# Patient Record
Sex: FEMALE | Race: WHITE | NOT HISPANIC OR LATINO | Employment: UNEMPLOYED | URBAN - METROPOLITAN AREA
[De-identification: names, ages, dates, MRNs, and addresses within clinical notes are randomized per-mention and may not be internally consistent; named-entity substitution may affect disease eponyms.]

---

## 2017-02-23 ENCOUNTER — HOSPITAL ENCOUNTER (INPATIENT)
Facility: HOSPITAL | Age: 57
LOS: 7 days | Discharge: HOME | DRG: 392 | End: 2017-03-05
Attending: INTERNAL MEDICINE | Admitting: INTERNAL MEDICINE
Payer: COMMERCIAL

## 2017-02-23 DIAGNOSIS — R10.9 ABDOMINAL PAIN: Primary | ICD-10-CM

## 2017-02-23 DIAGNOSIS — I34.1 MITRAL VALVE PROLAPSE: ICD-10-CM

## 2017-02-23 DIAGNOSIS — K70.30 ALCOHOLIC CIRRHOSIS OF LIVER WITHOUT ASCITES: ICD-10-CM

## 2017-02-23 DIAGNOSIS — E87.1 HYPONATREMIA: ICD-10-CM

## 2017-02-23 DIAGNOSIS — R11.2 NAUSEA & VOMITING: ICD-10-CM

## 2017-02-23 DIAGNOSIS — K21.9 GASTROESOPHAGEAL REFLUX DISEASE, ESOPHAGITIS PRESENCE NOT SPECIFIED: ICD-10-CM

## 2017-02-23 DIAGNOSIS — R11.2 NAUSEA AND VOMITING, INTRACTABILITY OF VOMITING NOT SPECIFIED, UNSPECIFIED VOMITING TYPE: ICD-10-CM

## 2017-02-23 DIAGNOSIS — K52.9 GASTROENTERITIS: ICD-10-CM

## 2017-02-23 DIAGNOSIS — I10 HYPERTENSION: ICD-10-CM

## 2017-02-23 LAB
ALBUMIN SERPL BCP-MCNC: 3 G/DL
ALP SERPL-CCNC: 222 U/L
ALT SERPL W/O P-5'-P-CCNC: 18 U/L
AMYLASE SERPL-CCNC: 28 U/L
ANION GAP SERPL CALC-SCNC: 15 MMOL/L
APTT BLDCRRT: 29.8 SEC
AST SERPL-CCNC: 99 U/L
BASOPHILS # BLD AUTO: 0 K/UL
BASOPHILS NFR BLD: 0.3 %
BILIRUB SERPL-MCNC: 1.7 MG/DL
BILIRUB UR QL STRIP: NEGATIVE
BUN SERPL-MCNC: 2 MG/DL
CALCIUM SERPL-MCNC: 8.7 MG/DL
CHLORIDE SERPL-SCNC: 93 MMOL/L
CLARITY UR: CLEAR
CO2 SERPL-SCNC: 22 MMOL/L
COLOR UR: YELLOW
CREAT SERPL-MCNC: 0.7 MG/DL
DIFFERENTIAL METHOD: ABNORMAL
EOSINOPHIL # BLD AUTO: 0.1 K/UL
EOSINOPHIL NFR BLD: 0.5 %
ERYTHROCYTE [DISTWIDTH] IN BLOOD BY AUTOMATED COUNT: 14.3 %
EST. GFR  (AFRICAN AMERICAN): >60 ML/MIN/1.73 M^2
EST. GFR  (NON AFRICAN AMERICAN): >60 ML/MIN/1.73 M^2
GLUCOSE SERPL-MCNC: 112 MG/DL
GLUCOSE UR QL STRIP: NEGATIVE
HCT VFR BLD AUTO: 45.3 %
HGB BLD-MCNC: 15 G/DL
HGB UR QL STRIP: NEGATIVE
INR PPP: 1.2
KETONES UR QL STRIP: NEGATIVE
LEUKOCYTE ESTERASE UR QL STRIP: NEGATIVE
LIPASE SERPL-CCNC: 19 U/L
LYMPHOCYTES # BLD AUTO: 1.3 K/UL
LYMPHOCYTES NFR BLD: 11.5 %
MCH RBC QN AUTO: 33.3 PG
MCHC RBC AUTO-ENTMCNC: 33.1 %
MCV RBC AUTO: 101 FL
MONOCYTES # BLD AUTO: 0.9 K/UL
MONOCYTES NFR BLD: 7.3 %
NEUTROPHILS # BLD AUTO: 9.4 K/UL
NEUTROPHILS NFR BLD: 80.4 %
NITRITE UR QL STRIP: NEGATIVE
PH UR STRIP: 6 [PH] (ref 5–8)
PLATELET # BLD AUTO: 165 K/UL
PMV BLD AUTO: 9.3 FL
POTASSIUM SERPL-SCNC: 4.3 MMOL/L
PROT SERPL-MCNC: 7.1 G/DL
PROT UR QL STRIP: NEGATIVE
PROTHROMBIN TIME: 12.1 SEC
RBC # BLD AUTO: 4.51 M/UL
SODIUM SERPL-SCNC: 130 MMOL/L
SP GR UR STRIP: <=1.005 (ref 1–1.03)
TSH SERPL DL<=0.005 MIU/L-ACNC: 1.49 UIU/ML
URN SPEC COLLECT METH UR: ABNORMAL
UROBILINOGEN UR STRIP-ACNC: NEGATIVE EU/DL
WBC # BLD AUTO: 11.7 K/UL
WBC #/AREA STL HPF: NORMAL /[HPF]

## 2017-02-23 PROCEDURE — 87449 NOS EACH ORGANISM AG IA: CPT

## 2017-02-23 PROCEDURE — 83690 ASSAY OF LIPASE: CPT

## 2017-02-23 PROCEDURE — 87427 SHIGA-LIKE TOXIN AG IA: CPT | Mod: 59

## 2017-02-23 PROCEDURE — 87329 GIARDIA AG IA: CPT

## 2017-02-23 PROCEDURE — 85730 THROMBOPLASTIN TIME PARTIAL: CPT

## 2017-02-23 PROCEDURE — 82150 ASSAY OF AMYLASE: CPT

## 2017-02-23 PROCEDURE — 93005 ELECTROCARDIOGRAM TRACING: CPT

## 2017-02-23 PROCEDURE — 99219 PR INITIAL OBSERVATION CARE,LEVL II: CPT | Mod: ,,, | Performed by: INTERNAL MEDICINE

## 2017-02-23 PROCEDURE — 80053 COMPREHEN METABOLIC PANEL: CPT

## 2017-02-23 PROCEDURE — 85610 PROTHROMBIN TIME: CPT

## 2017-02-23 PROCEDURE — 85025 COMPLETE CBC W/AUTO DIFF WBC: CPT

## 2017-02-23 PROCEDURE — 25000003 PHARM REV CODE 250: Performed by: INTERNAL MEDICINE

## 2017-02-23 PROCEDURE — 36415 COLL VENOUS BLD VENIPUNCTURE: CPT

## 2017-02-23 PROCEDURE — 87046 STOOL CULTR AEROBIC BACT EA: CPT

## 2017-02-23 PROCEDURE — 84443 ASSAY THYROID STIM HORMONE: CPT

## 2017-02-23 PROCEDURE — 87209 SMEAR COMPLEX STAIN: CPT

## 2017-02-23 PROCEDURE — G0379 DIRECT REFER HOSPITAL OBSERV: HCPCS

## 2017-02-23 PROCEDURE — 63600175 PHARM REV CODE 636 W HCPCS: Performed by: INTERNAL MEDICINE

## 2017-02-23 PROCEDURE — 81003 URINALYSIS AUTO W/O SCOPE: CPT

## 2017-02-23 PROCEDURE — G0378 HOSPITAL OBSERVATION PER HR: HCPCS

## 2017-02-23 PROCEDURE — 89055 LEUKOCYTE ASSESSMENT FECAL: CPT

## 2017-02-23 PROCEDURE — 87493 C DIFF AMPLIFIED PROBE: CPT

## 2017-02-23 PROCEDURE — 87045 FECES CULTURE AEROBIC BACT: CPT

## 2017-02-23 RX ORDER — GLUCAGON 1 MG
1 KIT INJECTION
Status: DISCONTINUED | OUTPATIENT
Start: 2017-02-23 | End: 2017-03-05 | Stop reason: HOSPADM

## 2017-02-23 RX ORDER — IBUPROFEN 200 MG
1 TABLET ORAL DAILY
Status: DISCONTINUED | OUTPATIENT
Start: 2017-02-23 | End: 2017-03-05 | Stop reason: HOSPADM

## 2017-02-23 RX ORDER — HYDROCODONE BITARTRATE AND ACETAMINOPHEN 5; 325 MG/1; MG/1
1 TABLET ORAL EVERY 4 HOURS PRN
Status: DISCONTINUED | OUTPATIENT
Start: 2017-02-23 | End: 2017-03-05 | Stop reason: HOSPADM

## 2017-02-23 RX ORDER — ENOXAPARIN SODIUM 100 MG/ML
40 INJECTION SUBCUTANEOUS EVERY 24 HOURS
Status: DISCONTINUED | OUTPATIENT
Start: 2017-02-23 | End: 2017-03-05 | Stop reason: HOSPADM

## 2017-02-23 RX ORDER — IBUPROFEN 200 MG
16 TABLET ORAL
Status: DISCONTINUED | OUTPATIENT
Start: 2017-02-23 | End: 2017-03-05 | Stop reason: HOSPADM

## 2017-02-23 RX ORDER — PANTOPRAZOLE SODIUM 40 MG/1
40 TABLET, DELAYED RELEASE ORAL DAILY
Status: DISCONTINUED | OUTPATIENT
Start: 2017-02-24 | End: 2017-03-05 | Stop reason: HOSPADM

## 2017-02-23 RX ORDER — ACETAMINOPHEN 325 MG/1
650 TABLET ORAL EVERY 6 HOURS PRN
Status: DISCONTINUED | OUTPATIENT
Start: 2017-02-23 | End: 2017-03-05 | Stop reason: HOSPADM

## 2017-02-23 RX ORDER — SODIUM CHLORIDE 9 MG/ML
INJECTION, SOLUTION INTRAVENOUS CONTINUOUS
Status: DISCONTINUED | OUTPATIENT
Start: 2017-02-23 | End: 2017-03-01

## 2017-02-23 RX ORDER — IBUPROFEN 200 MG
24 TABLET ORAL
Status: DISCONTINUED | OUTPATIENT
Start: 2017-02-23 | End: 2017-03-05 | Stop reason: HOSPADM

## 2017-02-23 RX ORDER — ONDANSETRON 2 MG/ML
4 INJECTION INTRAMUSCULAR; INTRAVENOUS EVERY 6 HOURS PRN
Status: DISCONTINUED | OUTPATIENT
Start: 2017-02-23 | End: 2017-03-05 | Stop reason: HOSPADM

## 2017-02-23 RX ADMIN — NICOTINE 1 PATCH: 14 PATCH, EXTENDED RELEASE TRANSDERMAL at 06:02

## 2017-02-23 RX ADMIN — SODIUM CHLORIDE: 0.9 INJECTION, SOLUTION INTRAVENOUS at 05:02

## 2017-02-23 RX ADMIN — ENOXAPARIN SODIUM 40 MG: 100 INJECTION SUBCUTANEOUS at 05:02

## 2017-02-23 RX ADMIN — HYDROCODONE BITARTRATE AND ACETAMINOPHEN 1 TABLET: 5; 325 TABLET ORAL at 08:02

## 2017-02-23 NOTE — IP AVS SNAPSHOT
[address] [place] [address]  Phone: [phone]           Patient Discharge Instructions     Our goal is to set you up for success. This packet includes information on your condition, medications, and your home care. It will help you to care for yourself so you don't get sicker and need to go back to the hospital.     Please ask your nurse if you have any questions.        There are many details to remember when preparing to leave the hospital. Here is what you will need to do:    1. Take your medicine. If you are prescribed medications, review your Medication List in the following pages. You may have new medications to  at the pharmacy and others that you'll need to stop taking. Review the instructions for how and when to take your medications. Talk with your doctor or nurses if you are unsure of what to do.     2. Go to your follow-up appointments. Specific follow-up information is listed in the following pages. Your may be contacted by a transition nurse or clinical provider about future appointments. Be sure we have all of the phone numbers to reach you, if needed. Please contact your provider's office if you are unable to make an appointment.     3. Watch for warning signs. Your doctor or nurse will give you detailed warning signs to watch for and when to call for assistance. These instructions may also include educational information about your condition. If you experience any of warning signs to your health, call your doctor.               ** Verify the list of medication(s) below is accurate and up to date. Carry this with you in case of emergency. If your medications have changed, please notify your healthcare provider.             Medication List      START taking these medications        Additional Info                      diphenoxylate-atropine 2.5-0.025 mg 2.5-0.025 mg per tablet   Commonly known as:  LOMOTIL   Quantity:  20 tablet   Refills:  0    Dose:  1 tablet    Last time this was given:  1 tablet on 3/5/2017  9:11 AM   Instructions:  Take 1 tablet by mouth every 6 (six) hours as needed for Diarrhea.     Begin Date    AM    Noon    PM    Bedtime       lorazepam 0.5 MG tablet   Commonly known as:  ATIVAN   Quantity:  30 tablet   Refills:  0   Dose:  0.5 mg    Last time this was given:  0.5 mg on 3/4/2017  8:31 PM   Instructions:  Take 1 tablet (0.5 mg total) by mouth every evening.     Begin Date    AM    Noon    PM    Bedtime       magnesium oxide 400 mg tablet   Commonly known as:  MAG-OX   Quantity:  60 tablet   Refills:  0   Dose:  800 mg    Instructions:  Take 2 tablets (800 mg total) by mouth 3 (three) times daily with meals.     Begin Date    AM    Noon    PM    Bedtime       potassium chloride 10 MEQ Cpsr   Commonly known as:  MICRO-K   Quantity:  20 capsule   Refills:  0   Dose:  20 mEq    Instructions:  Take 2 capsules (20 mEq total) by mouth once daily.     Begin Date    AM    Noon    PM    Bedtime       spironolactone 25 MG tablet   Commonly known as:  ALDACTONE   Quantity:  30 tablet   Refills:  0   Dose:  25 mg    Last time this was given:  25 mg on 3/5/2017  8:27 AM   Instructions:  Take 1 tablet (25 mg total) by mouth once daily.     Begin Date    AM    Noon    PM    Bedtime         CHANGE how you take these medications        Additional Info                      trazodone 100 MG tablet   Commonly known as:  DESYREL   Quantity:  30 tablet   Refills:  0   Dose:  100 mg   What changed:  how much to take    Last time this was given:  50 mg on 3/4/2017  8:31 PM   Instructions:  Take 1 tablet (100 mg total) by mouth every evening.     Begin Date    AM    Noon    PM    Bedtime         CONTINUE taking these medications        Additional Info                      atenolol 100 MG tablet   Commonly known as:  TENORMIN   Refills:  0   Dose:  100 mg    Last time this was given:  100 mg on 3/4/2017  8:31 PM   Instructions:  Take 100 mg by mouth every  evening.     Begin Date    AM    Noon    PM    Bedtime       irbesartan 150 MG tablet   Commonly known as:  AVAPRO   Refills:  0   Dose:  150 mg    Last time this was given:  150 mg on 3/5/2017  8:27 AM   Instructions:  Take 150 mg by mouth once daily.     Begin Date    AM    Noon    PM    Bedtime       omeprazole 20 MG capsule   Commonly known as:  PRILOSEC   Refills:  0   Dose:  20 mg    Instructions:  Take 20 mg by mouth once daily.     Begin Date    AM    Noon    PM    Bedtime         STOP taking these medications     sodium chloride 1 gram tablet            Where to Get Your Medications      These medications were sent to [id number] - [place] - [address] AT [facility] [address]  [address] [address]     Phone:  [phone]     diphenoxylate-atropine 2.5-0.025 mg 2.5-0.025 mg per tablet    lorazepam 0.5 MG tablet    potassium chloride 10 MEQ Cpsr    spironolactone 25 MG tablet    trazodone 100 MG tablet         You can get these medications from any pharmacy     You don't need a prescription for these medications     magnesium oxide 400 mg tablet                  Please bring to all follow up appointments:    1. A copy of your discharge instructions.  2. All medicines you are currently taking in their original bottles.  3. Identification and insurance card.    Please arrive 15 minutes ahead of scheduled appointment time.    Please call 24 hours in advance if you must reschedule your appointment and/or time.        Follow-up Information     Follow up with Ammon Santos MD In 2 weeks.    Specialty:  Family Medicine    Contact information:    [address] [place] [address] [phone]          Follow up with Sayra Monsno MD In 2 weeks.    Specialties:  Gastroenterology, Transplant, Hepatology    Contact information:    [address]  [facility] [id number] [phone]          Discharge Instructions     Future Orders    CT Abdomen  Pelvis W Wo Contrast     Process Instructions:    No food or drink for 4 hours prior to the exam.  The patient must arrive in radiology 2 hours prior to exam for prep (drinking barium).    Scheduling Instructions:    Please add liver spleen volume, triple phase   Pt will be in Room 320    Questions:    Oral/Rectal Contrast instructions:  Routine Oral Contrast    Special CT ABD Protocol Request?:  Routine    Reason for Exam:  abd pain    Is the patient pregnant?:  No    Is the patient allergic to iodine or contrast? Has a steroid / antihistamine prep been administered?:  No    Is the patient on ANY Metformin drug such as Glugophage/Glucovance?           Should be off drug 48 hours after contrast. Check renal function before restart.:  No    Age > 60 years?:  Yes    History of Kidney Disease - including: decreased kidney function, dialysis, kidney transplay, single kidney, kidney cancer, kidney surgery?:  None    Does the patient have high blood preasure requiring medical treatment?:  Yes    Diabetes?:  No    May the Radiologist modify the order per protocol to meet the clinical needs of the patient?:  Yes    Recist criteria?:  No    Will this service be billed to a Worker's Comp policy?:  No    CT Chest Without Contrast     Process Instructions:    No food or drink 4 hours prior to exam.    Questions:    Reason for Exam:      Is the patient pregnant?:  No    May the Radiologist modify the order per protocol to meet the clinical needs of the patient?:  Yes    Activity as tolerated     Call MD for:  difficulty breathing or increased cough     Call MD for:  persistent nausea and vomiting or diarrhea     Call MD for:  temperature >100.4     Diet general     Questions:    Total calories:      Fat restriction, if any:      Protein restriction, if any:      Na restriction, if any:      Fluid restriction:      Additional restrictions:          Discharge Instructions       Thank you for choosing [facility] for your  "medical care. The primary doctor who is taking care of you at the time of your discharge is Susy Johnson MD.     You were admitted to the hospital with Hypomagnesemia.     Please note your discharge instructions, including diet/activity restrictions, follow-up appointments, and medication changes.  If you have any questions about your medical issues, prescriptions, or any other questions, please feel free to contact the [facility] Medicine Dept at [phone] and we will help.    If you are previously with Home health, outpatient PT/OT or under a therapy program, you are cleared to return to those programs.    Please direct all long term medication refills and follow up to your primary care provider, Ammon Santos MD. Thank you again for letting us take care of your health care needs.    CALL OFFICE TO SCHEDULE OUTPATIENT LABS.      Discharge References/Attachments     CIRRHOSIS OF THE LIVER, DISCHARGE INSTRUCTIONS FOR (ENGLISH)    ALCOHOL ADDICTION (ALCOHOLISM), SIGNS OF (ENGLISH)    ALCOHOLISM: GETTING HELP (ENGLISH)    ALCOHOLISM: RESOURCES FOR FAMILY AND FRIENDS (ENGLISH)        Primary Diagnosis     Your primary diagnosis was:  Low Blood Magnesium Level      Admission Information     Date & Time Provider Department CSN    2/23/2017  3:10 PM Susy Johnson MD [facility] [id number]      Care Providers     Provider Role Specialty Primary office phone    Susy Johnson MD Attending Provider Internal Medicine [phone]    Shanae Gonzalez MD Consulting Physician  Gastroenterology [phone]    Shanae Gonzalez MD Surgeon  Gastroenterology [phone]      Your Vitals Were     BP Pulse Temp Resp Height Weight    148/72 80 98.4 °F (36.9 °C) (Oral) 16 5' 1" (1.549 m) 79.8 kg (176 lb)    SpO2 BMI             92% 33.25 kg/m2         Recent Lab Values     No lab values to display.      Pending Labs     Order Current Status    Chromogranin A In process    Pancreastatin In " process    Specimen to Pathology - Surgery In process    Stool Exam-Ova,Cysts,Parasites In process      Allergies as of 3/5/2017     No Known Allergies      [facility]     [facility] Nurse Care Line - 24/7 Assistance  Unless otherwise directed by your provider, please contact [facility], our nurse care line that is available for 24/7 assistance.     Registered nurses in the [facility] provide clinical advisement, health education, appointment booking, and other advisory services.  Call for this free service at [phone].        Advance Directives     An advance directive is a document which, in the event you are no longer able to make decisions for yourself, tells your healthcare team what kind of treatment you do or do not want to receive, or who you would like to make those decisions for you.  If you do not currently have an advance directive, [facility] encourages you to create one.  For more information call:  [phone]), [phone] ([phone]),  or log on to [email or web address].        Language Assistance Services     ATTENTION: Language assistance services are available, free of charge. Please call [phone].      ATENCIÓN: Si habla español, tiene a ricketts disposición servicios gratuitos de asistencia lingüística. Llame al [phone].     CHÚ Ý: N?u b?n nói Ti?ng Vi?t, có các d?ch v? h? tr? ngôn ng? mi?n phí dành cho b?n. G?i s? [phone].         [facility] complies with applicable Federal civil rights laws and does not discriminate on the basis of race, color, national origin, age, disability, or sex.

## 2017-02-23 NOTE — PLAN OF CARE
Problem: Patient Care Overview  Goal: Plan of Care Review  Outcome: Ongoing (interventions implemented as appropriate)  Pt C/O minimal pain to bilat lower abd and right medial abd. C/O intermittent nausea and diarrhea. Hat in toilet to collect stool sample. Pt educated on fall risk.

## 2017-02-23 NOTE — H&P
PCP: Ammon Santos MD    History & Physical    Chief Complaint: Nausea, vomiting diarrhea and dehydration for 6 days.    History of Present Illness:  Patient is a 57 y.o. female admitted to Hospitalist Service from Dr. Santos's office with complaint of nausea, vomiting and diarrhea for 6 days. Patient reportedly has past medical history significant for GERD, hypertension, hyponatremia (chronic) and MVP. Patient reported 10-20 loose watery bowel movements. Patient stated, her abdomen states distended. No melena, bleeding per rectum, use of NSAIDs or antibiotics reported recently. No sick contact or recent travel history. Patient reports frequent nausea and episode of emesis, on an average twice daily for the past week. No fever or chills. Patient denied chest pain, shortness of breath, abdominal pain, headache, vision changes, focal neuro-deficits, cough or fever.    Past Medical History   Diagnosis Date    GERD (gastroesophageal reflux disease)     Hypertension     Hyponatremia     Mitral valve prolapse      Past Surgical History   Procedure Laterality Date    Hysterectomy      Tonsillectomy       Family History   Problem Relation Age of Onset    Pancreatic cancer Mother      Social History   Substance Use Topics    Smoking status: Current Every Day Smoker     Packs/day: 1.00     Years: 30.00    Smokeless tobacco: None    Alcohol use 9.6 oz/week     2 Standard drinks or equivalent, 14 Glasses of wine per week      Review of patient's allergies indicates:  No Known Allergies  Facility-Administered Medications Prior to Admission   Medication    midazolam (PF) 5 mg/mL injection 5 mg     PTA Medications   Medication Sig    atenolol (TENORMIN) 100 MG tablet Take 100 mg by mouth every evening.     irbesartan (AVAPRO) 150 MG tablet Take 150 mg by mouth once daily.    sodium chloride 1 gram tablet Take 1 g by mouth once daily.    trazodone (DESYREL) 100 MG tablet Take 50 mg by mouth every evening.     omeprazole (PRILOSEC) 20 MG capsule Take 20 mg by mouth once daily.     Review of Systems:  Constitutional: no fever or chills  Eyes: no visual changes  Ears, nose, mouth, throat, and face: no nasal congestion or sore throat  Respiratory: no cough or shorness of breath  Cardiovascular: no chest pain or palpitations  Gastrointestinal: see HPI  Genitourinary: no hematuria or dysuria  Integument/breast: no rash or pruritis  Hematologic/lymphatic: no easy bruising or lymphadenopathy  Musculoskeletal: no arthralgias or myalgias  Neurological: no seizures or tremors.  Behavioral/Psych: no auditory or visual hallucinations  Endocrine: no heat or cold intolerance     OBJECTIVE:     Vital Signs (Most Recent)  Temp: 97.8 °F (36.6 °C) (02/23/17 1551)  Pulse: 79 (02/23/17 1551)  Resp: 18 (02/23/17 1551)  BP: 115/73 (02/23/17 1551)  SpO2: 96 % (02/23/17 1551)    Physical Exam:  General appearance: well developed, appears stated age  Head: normocephalic, atraumatic  Eyes:  conjunctivae/corneas clear. PERRL.  Nose: Nares normal. Septum midline.  Throat: lips, mucosa, and tongue normal; teeth and gums normal, no throat erythema.  Neck: supple, symmetrical, trachea midline, no JVD and thyroid not enlarged, symmetric, no tenderness/mass/nodules  Lungs:  clear to auscultation bilaterally and normal respiratory effort  Chest wall: no tenderness  Heart: regular rate and rhythm, S1, S2 normal, no murmur, click, rub or gallop  Abdomen: soft, non-tender non-distented; bowel sounds normal; Enlarged liver and spleen noted - non-tender.  Extremities: no cyanosis, clubbing or edema.   Pulses: 2+ and symmetric  Skin: Skin color, texture, turgor normal. No rashes or lesions.  Lymph nodes: Cervical, supraclavicular, and axillary nodes normal.  Neurologic: Normal strength and tone. No focal numbness or weakness. CNII-XII intact.      Laboratory:   CBC: No results for input(s): WBC, RBC, HGB, HCT, PLT, MCV, MCH, MCHC in the last 168 hours.  CMP: No  results for input(s): GLU, CALCIUM, ALBUMIN, PROT, NA, K, CO2, CL, BUN, CREATININE, ALKPHOS, ALT, AST, BILITOT in the last 168 hours.    No results found for: HGBA1C  Microbiology Results (last 7 days)     ** No results found for the last 168 hours. **        Diagnostic Results:  Chest X-Ray: Pending    Assessment/Plan:     * Acute Gastroenteritis  Case discussed with Dr. Santos.   Consult Gastronetrologist.   Obtain KUB.  Obtain abdominal US to evaluate hepato-splenomegaly.  Check CBC with differential.  Check CBC, CMP, lipase, amylase and UA.  Check stool for C. Diff, O+P, Cx, WBC and Giardia serology.  Continue IVF hydration.   Use IV anti-emetics as needed.     Nausea & vomiting  Supportive care.  Use anti-emetics as needed.  Continue IVF hydration.    Hypertension  Chronic problem. Will continue chronic medications and monitor for any changes, adjusting as needed.    GERD (gastroesophageal reflux disease)  Continue PPI.    Mitral valve prolapse  Tele-monitoring.    Hyponatremia - Chronic  Check TSH.  Follow serum lytes.    VTE Risk Mitigation         Ordered     enoxaparin injection 40 mg  Daily     Route:  Subcutaneous        02/23/17 1637     Medium Risk of VTE  Once      02/23/17 1637        Susy Johnson MD  Department of Hospital Medicine   [facility]

## 2017-02-23 NOTE — ASSESSMENT & PLAN NOTE
Case discussed with Dr. Santos.   Consult Gastronetrologist.   Obtain KUB.  Obtain abdominal US to evaluate hepato-splenomegaly.  Check CBC with differential.  Check CBC, CMP, lipase, amylase and UA.  Check stool for C. Diff, O+P, Cx, WBC and Giardia serology.  Continue IVF hydration.   Use IV anti-emetics as needed.

## 2017-02-24 LAB
ALBUMIN SERPL BCP-MCNC: 2.6 G/DL
ALP SERPL-CCNC: 185 U/L
ALT SERPL W/O P-5'-P-CCNC: 14 U/L
ANION GAP SERPL CALC-SCNC: 13 MMOL/L
AST SERPL-CCNC: 68 U/L
BASOPHILS # BLD AUTO: 0.1 K/UL
BASOPHILS NFR BLD: 0.8 %
BILIRUB SERPL-MCNC: 1.7 MG/DL
BUN SERPL-MCNC: 2 MG/DL
C DIFF GDH STL QL: POSITIVE
C DIFF TOX A+B STL QL IA: NEGATIVE
C DIFF TOX GENS STL QL NAA+PROBE: NEGATIVE
CALCIUM SERPL-MCNC: 7.9 MG/DL
CHLORIDE SERPL-SCNC: 95 MMOL/L
CO2 SERPL-SCNC: 24 MMOL/L
CREAT SERPL-MCNC: 0.6 MG/DL
DIFFERENTIAL METHOD: ABNORMAL
E COLI SXT1 STL QL IA: NEGATIVE
E COLI SXT2 STL QL IA: NEGATIVE
EOSINOPHIL # BLD AUTO: 0.1 K/UL
EOSINOPHIL NFR BLD: 1.1 %
ERYTHROCYTE [DISTWIDTH] IN BLOOD BY AUTOMATED COUNT: 14.7 %
EST. GFR  (AFRICAN AMERICAN): >60 ML/MIN/1.73 M^2
EST. GFR  (NON AFRICAN AMERICAN): >60 ML/MIN/1.73 M^2
GLUCOSE SERPL-MCNC: 86 MG/DL
HCT VFR BLD AUTO: 39.2 %
HGB BLD-MCNC: 13.3 G/DL
LYMPHOCYTES # BLD AUTO: 1.9 K/UL
LYMPHOCYTES NFR BLD: 23.8 %
MAGNESIUM SERPL-MCNC: 0.7 MG/DL
MCH RBC QN AUTO: 33.9 PG
MCHC RBC AUTO-ENTMCNC: 33.8 %
MCV RBC AUTO: 100 FL
MONOCYTES # BLD AUTO: 0.7 K/UL
MONOCYTES NFR BLD: 8.9 %
NEUTROPHILS # BLD AUTO: 5.2 K/UL
NEUTROPHILS NFR BLD: 65.4 %
PLATELET # BLD AUTO: 154 K/UL
PMV BLD AUTO: 8.9 FL
POTASSIUM SERPL-SCNC: 3.8 MMOL/L
PROT SERPL-MCNC: 5.9 G/DL
RBC # BLD AUTO: 3.91 M/UL
SODIUM SERPL-SCNC: 132 MMOL/L
WBC # BLD AUTO: 7.9 K/UL

## 2017-02-24 PROCEDURE — 99233 SBSQ HOSP IP/OBS HIGH 50: CPT | Mod: ,,, | Performed by: INTERNAL MEDICINE

## 2017-02-24 PROCEDURE — 80053 COMPREHEN METABOLIC PANEL: CPT

## 2017-02-24 PROCEDURE — 85025 COMPLETE CBC W/AUTO DIFF WBC: CPT

## 2017-02-24 PROCEDURE — 36415 COLL VENOUS BLD VENIPUNCTURE: CPT

## 2017-02-24 PROCEDURE — 25000003 PHARM REV CODE 250: Performed by: NURSE PRACTITIONER

## 2017-02-24 PROCEDURE — 63600175 PHARM REV CODE 636 W HCPCS: Performed by: INTERNAL MEDICINE

## 2017-02-24 PROCEDURE — 93005 ELECTROCARDIOGRAM TRACING: CPT

## 2017-02-24 PROCEDURE — G0378 HOSPITAL OBSERVATION PER HR: HCPCS

## 2017-02-24 PROCEDURE — 25000003 PHARM REV CODE 250: Performed by: INTERNAL MEDICINE

## 2017-02-24 PROCEDURE — 89055 LEUKOCYTE ASSESSMENT FECAL: CPT

## 2017-02-24 PROCEDURE — 83735 ASSAY OF MAGNESIUM: CPT

## 2017-02-24 RX ORDER — CHOLESTYRAMINE 4 G/4.8G
1 POWDER, FOR SUSPENSION ORAL EVERY 12 HOURS PRN
Status: DISCONTINUED | OUTPATIENT
Start: 2017-02-24 | End: 2017-02-28

## 2017-02-24 RX ORDER — PANTOPRAZOLE SODIUM 40 MG/1
40 TABLET, DELAYED RELEASE ORAL DAILY
Status: DISCONTINUED | OUTPATIENT
Start: 2017-02-24 | End: 2017-02-24

## 2017-02-24 RX ORDER — DIAZEPAM 10 MG/2ML
5 INJECTION INTRAMUSCULAR ONCE
Status: DISCONTINUED | OUTPATIENT
Start: 2017-02-24 | End: 2017-03-05 | Stop reason: HOSPADM

## 2017-02-24 RX ORDER — TRAZODONE HYDROCHLORIDE 50 MG/1
50 TABLET ORAL NIGHTLY
Status: DISCONTINUED | OUTPATIENT
Start: 2017-02-24 | End: 2017-03-05

## 2017-02-24 RX ORDER — FOLIC ACID 1 MG/1
1 TABLET ORAL 2 TIMES DAILY
Status: DISCONTINUED | OUTPATIENT
Start: 2017-02-24 | End: 2017-03-05 | Stop reason: HOSPADM

## 2017-02-24 RX ORDER — CIPROFLOXACIN 500 MG/1
500 TABLET ORAL EVERY 12 HOURS
Status: DISCONTINUED | OUTPATIENT
Start: 2017-02-24 | End: 2017-02-27

## 2017-02-24 RX ORDER — ATENOLOL 50 MG/1
100 TABLET ORAL NIGHTLY
Status: DISCONTINUED | OUTPATIENT
Start: 2017-02-24 | End: 2017-03-05 | Stop reason: HOSPADM

## 2017-02-24 RX ORDER — THIAMINE HCL 100 MG
100 TABLET ORAL 2 TIMES DAILY
Status: DISCONTINUED | OUTPATIENT
Start: 2017-02-24 | End: 2017-03-05 | Stop reason: HOSPADM

## 2017-02-24 RX ORDER — METRONIDAZOLE 500 MG/1
500 TABLET ORAL EVERY 8 HOURS
Status: DISCONTINUED | OUTPATIENT
Start: 2017-02-24 | End: 2017-02-27

## 2017-02-24 RX ORDER — IRBESARTAN 150 MG/1
150 TABLET ORAL DAILY
Status: DISCONTINUED | OUTPATIENT
Start: 2017-02-24 | End: 2017-03-05 | Stop reason: HOSPADM

## 2017-02-24 RX ORDER — L. ACIDOPHILUS/L.BULGARICUS 100MM CELL
1 GRANULES IN PACKET (EA) ORAL 2 TIMES DAILY
Status: DISCONTINUED | OUTPATIENT
Start: 2017-02-24 | End: 2017-03-05 | Stop reason: HOSPADM

## 2017-02-24 RX ADMIN — METRONIDAZOLE 500 MG: 500 TABLET ORAL at 09:02

## 2017-02-24 RX ADMIN — METRONIDAZOLE 500 MG: 500 TABLET ORAL at 01:02

## 2017-02-24 RX ADMIN — LACTOBACILLUS ACIDOPHILUS / LACTOBACILLUS BULGARICUS 1 EACH: 100 MILLION CFU STRENGTH GRANULES at 01:02

## 2017-02-24 RX ADMIN — HYDROCODONE BITARTRATE AND ACETAMINOPHEN 1 TABLET: 5; 325 TABLET ORAL at 04:02

## 2017-02-24 RX ADMIN — HYDROCODONE BITARTRATE AND ACETAMINOPHEN 1 TABLET: 5; 325 TABLET ORAL at 07:02

## 2017-02-24 RX ADMIN — FOLIC ACID 1 MG: 1 TABLET ORAL at 09:02

## 2017-02-24 RX ADMIN — SODIUM CHLORIDE: 0.9 INJECTION, SOLUTION INTRAVENOUS at 02:02

## 2017-02-24 RX ADMIN — THIAMINE HCL TAB 100 MG 100 MG: 100 TAB at 09:02

## 2017-02-24 RX ADMIN — PANTOPRAZOLE SODIUM 40 MG: 40 TABLET, DELAYED RELEASE ORAL at 08:02

## 2017-02-24 RX ADMIN — ATENOLOL 100 MG: 50 TABLET ORAL at 09:02

## 2017-02-24 RX ADMIN — NICOTINE 1 PATCH: 14 PATCH, EXTENDED RELEASE TRANSDERMAL at 08:02

## 2017-02-24 RX ADMIN — IRBESARTAN 150 MG: 150 TABLET ORAL at 08:02

## 2017-02-24 RX ADMIN — ENOXAPARIN SODIUM 40 MG: 100 INJECTION SUBCUTANEOUS at 01:02

## 2017-02-24 RX ADMIN — THIAMINE HCL TAB 100 MG 100 MG: 100 TAB at 01:02

## 2017-02-24 RX ADMIN — ATENOLOL 100 MG: 50 TABLET ORAL at 02:02

## 2017-02-24 RX ADMIN — LACTOBACILLUS ACIDOPHILUS / LACTOBACILLUS BULGARICUS 1 EACH: 100 MILLION CFU STRENGTH GRANULES at 09:02

## 2017-02-24 RX ADMIN — MAGNESIUM SULFATE HEPTAHYDRATE 3 G: 500 INJECTION, SOLUTION INTRAMUSCULAR; INTRAVENOUS at 03:02

## 2017-02-24 RX ADMIN — FOLIC ACID 1 MG: 1 TABLET ORAL at 01:02

## 2017-02-24 RX ADMIN — CIPROFLOXACIN HYDROCHLORIDE 500 MG: 500 TABLET, FILM COATED ORAL at 11:02

## 2017-02-24 RX ADMIN — TRAZODONE HYDROCHLORIDE 50 MG: 50 TABLET ORAL at 09:02

## 2017-02-24 NOTE — PROGRESS NOTES
Attempted to complete DC assessment however pt is having a procedure done at bedside. I will return to follow up. Arabella Gonzalez LMSW

## 2017-02-24 NOTE — PLAN OF CARE
Problem: Patient Care Overview  Goal: Plan of Care Review  Outcome: Ongoing (interventions implemented as appropriate)  Today my patient refused HIDA scan due to claustrophobia. Magnesium level was 0.7, initiated 3 grams of Magnesium Sulfate at 1525 at 62.5 mLs/hour. Patient reports less pain today gradually throughout the day, and only had one pain medication during my shift.

## 2017-02-24 NOTE — PLAN OF CARE
Problem: Patient Care Overview  Goal: Plan of Care Review  Outcome: Ongoing (interventions implemented as appropriate)  POC discussed with pt, she verbalized understanding to same. Q2hour rounding/safety checks utilized. Pt. Able to verbalize her needs. Denies N/V. Pt. Had 2 small stools overnight. José Antonio colored, frothy. Specimen sent to lab. VSS. Minimal complaints of pain, well controlled with po pain meds. Remains free from injury. Call light in reach. Bed low and locked. Nonskid socks when oob.

## 2017-02-24 NOTE — PROGRESS NOTES
Dr. Johnson notified of critical lab value of magnesium 0.7mg/dL.  Orders placed for daily magnesium and phosphorus level and 3 grams Magnesium IVPB.  Will continue to monitor.

## 2017-02-24 NOTE — PROGRESS NOTES
Monitor room notified this nurse of 9 beat run of V-tach.  Patient reports being asymptomatic at this time and VSS.  Dr. Johnson notified of cardiac event.  Orders placed for magnesium level and EKG.     EKG performed.  Will continue to monitor patient.

## 2017-02-24 NOTE — PROGRESS NOTES
Progress Note  Hospital Medicine  Patient Name:Sophia Edward  MRN:  [id number]  Patient Class: OP- Observation  Admit Date: 2/23/2017  Length of Stay: 0 days  Expected Discharge Date:   Attending Physician: Susy Johnson MD  Primary Care Provider:  Ammon Santos MD    SUBJECTIVE:     Principal Problem: Gastroenteritis  Initial history of present illness:Patient is a 57 y.o. female admitted to Hospitalist Service from Dr. Santos's office with complaint of nausea, vomiting and diarrhea for 6 days. Patient reportedly has past medical history significant for GERD, hypertension, hyponatremia (chronic) and MVP. Patient reported 10-20 loose watery bowel movements. Patient stated, her abdomen states distended. No melena, bleeding per rectum, use of NSAIDs or antibiotics reported recently. No sick contact or recent travel history. Patient reports frequent nausea and episode of emesis, on an average twice daily for the past week. No fever or chills. Patient denied chest pain, shortness of breath, abdominal pain, headache, vision changes, focal neuro-deficits, cough or fever.    PMH/PSH/SH/FH/Meds: reviewed.    Symptoms/Review of Systems: Continued diarrhea. Reports drinking 3 drinks a night. No shortness of breath, cough, chest pain or headache, fever or abdominal pain.     Diet:  Adequate intake.    Activity level: Normal.    Pain:  Patient reports no pain.       OBJECTIVE:   Vital Signs (Most Recent):      Temp: 98.1 °F (36.7 °C) (02/24/17 0722)  Pulse: 67 (02/24/17 0722)  Resp: 17 (02/24/17 0722)  BP: 135/73 (02/24/17 0722)  SpO2: 96 % (02/24/17 0722)       Vital Signs Range (Last 24H):  Temp:  [97.8 °F (36.6 °C)-98.2 °F (36.8 °C)]   Pulse:  [63-79]   Resp:  [17-18]   BP: (115-135)/(67-78)   SpO2:  [96 %-98 %]     Weight: 67.6 kg (149 lb)  Body mass index is 28.15 kg/(m^2).  No intake or output data in the 24 hours ending 02/24/17 1004  Physical Examination:  General appearance: well developed, appears stated age  Head:  normocephalic, atraumatic  Eyes: conjunctivae/corneas clear. PERRL.  Nose: Nares normal. Septum midline.  Throat: lips, mucosa, and tongue normal; teeth and gums normal, no throat erythema.  Neck: supple, symmetrical, trachea midline, no JVD and thyroid not enlarged, symmetric, no tenderness/mass/nodules  Lungs: clear to auscultation bilaterally and normal respiratory effort  Chest wall: no tenderness  Heart: regular rate and rhythm, S1, S2 normal, no murmur, click, rub or gallop  Abdomen: soft, non-tender non-distented; bowel sounds normal; Enlarged liver and spleen noted - non-tender.  Extremities: no cyanosis, clubbing or edema.   Pulses: 2+ and symmetric  Skin: Skin color, texture, turgor normal. No rashes or lesions.  Lymph nodes: Cervical, supraclavicular, and axillary nodes normal.  Neurologic: Normal strength and tone. No focal numbness or weakness. CNII-XII intact.       CBC:    Recent Labs  Lab 02/23/17  1604 02/24/17  0523   WBC 11.70 7.90   RBC 4.51 3.91*   HGB 15.0 13.3   HCT 45.3 39.2    154   * 100*   MCH 33.3* 33.9*   MCHC 33.1 33.8   BMP    Recent Labs  Lab 02/23/17  1604 02/24/17  0523   * 86   * 132*   K 4.3 3.8   CL 93* 95   CO2 22* 24   BUN 2* 2*   CREATININE 0.7 0.6   CALCIUM 8.7 7.9*      Diagnostic Results:  Microbiology Results (last 7 days)     Procedure Component Value Units Date/Time    C Diff Toxin by PCR [[id number]] Collected:  02/23/17 1736    Order Status:  No result Updated:  02/24/17 0948    Clostridium difficile EIA [[id number]]  (Abnormal) Collected:  02/23/17 1736    Order Status:  Completed Specimen:  Stool from Stool Updated:  02/24/17 0206     C. diff Antigen Positive (A)     C difficile Toxins A+B, EIA Negative      Testing not recommended for children <24 months old.       Stool culture [[id number]] Collected:  02/23/17 1736    Order Status:  Sent Specimen:  Stool from Stool Updated:  02/23/17 2101    E. coli 0157 antigen [[id number]] Collected:   02/23/17 1736    Order Status:  No result Specimen:  Stool from Stool Updated:  02/23/17 2101         Chest X-Ray: Negative chest.  No significant change    US abdomen:  1.  Mild sludge within the gallbladder which exhibits a mildly thickened wall and mild pericholecystic fluid.  The pericholecystic fluid is nonspecific given the more generalized mild perihepatic free fluid.  Please correlate clinically for findings of acute cholecystitis.  2.  Hepatomegaly with findings suggesting fat infiltration and nodular contour suggesting cirrhosis.  3.  Normal size spleen.    Assessment/Plan:     * Acute Gastroenteritis  Follow Dr. Gonzalez' recommendations.   Obtain KUB reviewed. US reviewed. Follow HIDA scan.  Check stool for C. Diff, O+P, Cx, WBC and Giardia serology.  Start Lactinex and flagyl.  Continue IVF hydration.   Use IV anti-emetics as needed.     Nausea & vomiting  Supportive care.  Use anti-emetics as needed.  Continue IVF hydration.    Hypertension  Chronic problem. Will continue chronic medications and monitor for any changes, adjusting as needed.    GERD (gastroesophageal reflux disease)  Continue PPI.      Mitral valve prolapse  Tele-monitoring.      Hyponatremia - Chronic  Check TSH.  Follow serum lytes.    Discussed with patient and family regarding the importance of not drinking alcohol. Updated her on the plan of care. Answered all questions. Time spent in care of the patient, counseling and coordination of care (Greater than 50% spent in direct face to face contact): 35 min.    VTE Risk Mitigation         Ordered     enoxaparin injection 40 mg  Daily     Route:  Subcutaneous        02/23/17 1637     Medium Risk of VTE  Once      02/23/17 1637        Susy Johnson MD  Department of Hospital Medicine   [facility]

## 2017-02-24 NOTE — PLAN OF CARE
The pt was alert and awake and able to verify all info on the face sheet as correct. She is independent in ADL's. She lives with her spouse, Rodrigo. She uses [facility] on [address] and Dr. Santos is her PCP. She has [employer] insurance coverage. The pt has no questions or concerns at this time. Arabella Gonzalez, [facility]     02/24/17 1446   Discharge Assessment   Assessment Type Discharge Planning Assessment   Confirmed/corrected address and phone number on facesheet? Yes   Assessment information obtained from? Patient   Communicated expected length of stay with patient/caregiver no   Type of Healthcare Directive Received (Rodrigo Edward spouse [phone])   If Healthcare Directive is received, is it scanned into Epic? no (comment)   Prior to hospitilization cognitive status: Alert/Oriented   Prior to hospitalization functional status: Independent   Current cognitive status: Alert/Oriented   Current Functional Status: Independent   Arrived From home or self-care   Lives With spouse   Able to Return to Prior Arrangements yes   Is patient able to care for self after discharge? Yes   How many people do you have in your home that can help with your care after discharge? 1   Readmission Within The Last 30 Days no previous admission in last 30 days   Patient currently being followed by outpatient case management? No   Patient currently receives home health services? No   Does the patient currently use HME? No   Patient currently receives private duty nursing? No   Patient currently receives any other outside agency services? No   Equipment Currently Used at Home none   Do you have any problems affording any of your prescribed medications? No  ([facility] [address] )   Is the patient taking medications as prescribed? yes   Do you have any financial concerns preventing you from receiving the healthcare you need? No   Does the patient have transportation to healthcare appointments? No   On Dialysis? No   Does the patient receive  services at the [facility]? No   Are there any open cases? No   Discharge Plan A Home   Discharge Plan B Home with family   Patient/Family In Agreement With Plan yes

## 2017-02-25 LAB
ALBUMIN SERPL BCP-MCNC: 2.5 G/DL
ALP SERPL-CCNC: 163 U/L
ALT SERPL W/O P-5'-P-CCNC: 12 U/L
ANION GAP SERPL CALC-SCNC: 11 MMOL/L
AST SERPL-CCNC: 61 U/L
BASOPHILS # BLD AUTO: 0.1 K/UL
BASOPHILS NFR BLD: 0.9 %
BILIRUB SERPL-MCNC: 1.6 MG/DL
BUN SERPL-MCNC: 2 MG/DL
CALCIUM SERPL-MCNC: 7.2 MG/DL
CHLORIDE SERPL-SCNC: 101 MMOL/L
CO2 SERPL-SCNC: 23 MMOL/L
CREAT SERPL-MCNC: 0.6 MG/DL
DIFFERENTIAL METHOD: ABNORMAL
EOSINOPHIL # BLD AUTO: 0 K/UL
EOSINOPHIL NFR BLD: 0.6 %
ERYTHROCYTE [DISTWIDTH] IN BLOOD BY AUTOMATED COUNT: 14.5 %
EST. GFR  (AFRICAN AMERICAN): >60 ML/MIN/1.73 M^2
EST. GFR  (NON AFRICAN AMERICAN): >60 ML/MIN/1.73 M^2
GLUCOSE SERPL-MCNC: 80 MG/DL
HCT VFR BLD AUTO: 38.9 %
HGB BLD-MCNC: 13 G/DL
LYMPHOCYTES # BLD AUTO: 1.3 K/UL
LYMPHOCYTES NFR BLD: 18.7 %
MAGNESIUM SERPL-MCNC: 1.3 MG/DL
MCH RBC QN AUTO: 33.5 PG
MCHC RBC AUTO-ENTMCNC: 33.4 %
MCV RBC AUTO: 100 FL
MONOCYTES # BLD AUTO: 0.7 K/UL
MONOCYTES NFR BLD: 9.8 %
NEUTROPHILS # BLD AUTO: 4.9 K/UL
NEUTROPHILS NFR BLD: 70 %
PHOSPHATE SERPL-MCNC: 3.2 MG/DL
PLATELET # BLD AUTO: 147 K/UL
PMV BLD AUTO: 8.5 FL
POTASSIUM SERPL-SCNC: 3.6 MMOL/L
PROT SERPL-MCNC: 5.7 G/DL
RBC # BLD AUTO: 3.88 M/UL
SODIUM SERPL-SCNC: 135 MMOL/L
WBC # BLD AUTO: 7 K/UL
WBC #/AREA STL HPF: ABNORMAL /[HPF]

## 2017-02-25 PROCEDURE — 25000003 PHARM REV CODE 250: Performed by: INTERNAL MEDICINE

## 2017-02-25 PROCEDURE — 25000003 PHARM REV CODE 250: Performed by: NURSE PRACTITIONER

## 2017-02-25 PROCEDURE — 36415 COLL VENOUS BLD VENIPUNCTURE: CPT

## 2017-02-25 PROCEDURE — 84100 ASSAY OF PHOSPHORUS: CPT

## 2017-02-25 PROCEDURE — G0378 HOSPITAL OBSERVATION PER HR: HCPCS

## 2017-02-25 PROCEDURE — 80053 COMPREHEN METABOLIC PANEL: CPT

## 2017-02-25 PROCEDURE — 85025 COMPLETE CBC W/AUTO DIFF WBC: CPT

## 2017-02-25 PROCEDURE — 83735 ASSAY OF MAGNESIUM: CPT

## 2017-02-25 PROCEDURE — 63600175 PHARM REV CODE 636 W HCPCS: Performed by: INTERNAL MEDICINE

## 2017-02-25 RX ORDER — MAGNESIUM SULFATE/D5W 1 G/50 ML
1 INTRAVENOUS SOLUTION, PIGGYBACK (ML) INTRAVENOUS ONCE
Status: COMPLETED | OUTPATIENT
Start: 2017-02-25 | End: 2017-02-25

## 2017-02-25 RX ADMIN — TRAZODONE HYDROCHLORIDE 50 MG: 50 TABLET ORAL at 09:02

## 2017-02-25 RX ADMIN — NICOTINE 1 PATCH: 14 PATCH, EXTENDED RELEASE TRANSDERMAL at 08:02

## 2017-02-25 RX ADMIN — THIAMINE HCL TAB 100 MG 100 MG: 100 TAB at 09:02

## 2017-02-25 RX ADMIN — THIAMINE HCL TAB 100 MG 100 MG: 100 TAB at 08:02

## 2017-02-25 RX ADMIN — LACTOBACILLUS ACIDOPHILUS / LACTOBACILLUS BULGARICUS 1 EACH: 100 MILLION CFU STRENGTH GRANULES at 08:02

## 2017-02-25 RX ADMIN — HYDROCODONE BITARTRATE AND ACETAMINOPHEN 1 TABLET: 5; 325 TABLET ORAL at 09:02

## 2017-02-25 RX ADMIN — SODIUM CHLORIDE: 0.9 INJECTION, SOLUTION INTRAVENOUS at 09:02

## 2017-02-25 RX ADMIN — CIPROFLOXACIN HYDROCHLORIDE 500 MG: 500 TABLET, FILM COATED ORAL at 09:02

## 2017-02-25 RX ADMIN — PANTOPRAZOLE SODIUM 40 MG: 40 TABLET, DELAYED RELEASE ORAL at 08:02

## 2017-02-25 RX ADMIN — LACTOBACILLUS ACIDOPHILUS / LACTOBACILLUS BULGARICUS 1 EACH: 100 MILLION CFU STRENGTH GRANULES at 09:02

## 2017-02-25 RX ADMIN — CIPROFLOXACIN HYDROCHLORIDE 500 MG: 500 TABLET, FILM COATED ORAL at 08:02

## 2017-02-25 RX ADMIN — FOLIC ACID 1 MG: 1 TABLET ORAL at 08:02

## 2017-02-25 RX ADMIN — HYDROCODONE BITARTRATE AND ACETAMINOPHEN 1 TABLET: 5; 325 TABLET ORAL at 02:02

## 2017-02-25 RX ADMIN — FOLIC ACID 1 MG: 1 TABLET ORAL at 09:02

## 2017-02-25 RX ADMIN — METRONIDAZOLE 500 MG: 500 TABLET ORAL at 02:02

## 2017-02-25 RX ADMIN — IRBESARTAN 150 MG: 150 TABLET ORAL at 08:02

## 2017-02-25 RX ADMIN — Medication 1 G: at 11:02

## 2017-02-25 RX ADMIN — ATENOLOL 100 MG: 50 TABLET ORAL at 09:02

## 2017-02-25 RX ADMIN — METRONIDAZOLE 500 MG: 500 TABLET ORAL at 05:02

## 2017-02-25 RX ADMIN — ENOXAPARIN SODIUM 40 MG: 100 INJECTION SUBCUTANEOUS at 11:02

## 2017-02-25 RX ADMIN — CHOLESTYRAMINE 4 G: 4 POWDER, FOR SUSPENSION ORAL at 05:02

## 2017-02-25 RX ADMIN — METRONIDAZOLE 500 MG: 500 TABLET ORAL at 09:02

## 2017-02-25 RX ADMIN — HYDROCODONE BITARTRATE AND ACETAMINOPHEN 1 TABLET: 5; 325 TABLET ORAL at 05:02

## 2017-02-25 NOTE — PROGRESS NOTES
The case was discussed with Dr. Santos and Dr. Johnson.  The patient is a   57-year-old  female who drinks a fair amount of alcohol.  The patient   has hepatosplenomegaly.  The patient did not have a HIDA scan as she was   claustrophobic.  The patient denies any history of dysphagia, odynophagia, but   has diarrhea.  C. diff has been negative.  The patient is on Flagyl.  We will   add Cipro to that regimen.  Continue to follow the patient and she needs to   abstain from alcohol and further workup will probably be required and proceed   from there.  Consideration will be given for upper endoscopy at a later date.      MICHELE/  dd: 02/24/2017 23:27:31 (CST)  td: 02/24/2017 23:50:36 (CST)  Doc ID   #[id number]  Job ID #[id number]    CC: Susy Santos M.D.

## 2017-02-25 NOTE — PROGRESS NOTES
HISTORY OF PRESENT ILLNESS:  The patient is a 57-year-old  female who   consumes a fair amount of alcohol, has hepatosplenomegaly, is having less   diarrhea, has responded to cholestyramine.  The patient denies any new symptoms   at this time.  The patient refused EGD last night, but however, has agreed to do   it as an outpatient; we can give her the phone number, etc. for outpatient   services and will follow the patient up closely.  The patient has   hepatosplenomegaly and also has some features of hepatic decompensation.    Examination of chest, cardiovascular system and abdomen remains essentially the   same as before.  Lab data for C. diff toxin by PCR is negative.  Her GDH antigen   was positive and at this point, the patient does not have any C. diff.  The   patient will be closely followed up and further workup will be done as an   outpatient basis.    Once again, I am grateful to Dr. Ammon Alves and Dr. Johnson for this referral.      MICHELE/  dd: 02/25/2017 13:56:34 (CST)  td: 02/25/2017 14:47:02 (CST)  Doc ID   #[id number]  Job ID #[id number]    CC:

## 2017-02-25 NOTE — PROGRESS NOTES
Pt doing well has not been to bathroom but once this am. She states the diarrhea is getting way better. She is also wanting different food  Like thick soups..  at bedside.

## 2017-02-25 NOTE — PLAN OF CARE
Problem: Patient Care Overview  Goal: Plan of Care Review  Outcome: Ongoing (interventions implemented as appropriate)  Pt aaox4. Pain controlled with po pain medicine. Multiple loose stools during shift. Dr. morel to see patient. New orders for cipro and questrian. See mar. Iv fluids administered throughout shift. Patient free from falls will continue to monitor.

## 2017-02-25 NOTE — PROGRESS NOTES
Pt states the diarrhea has gotten better, and she is drinking well. She states she slept better last night.

## 2017-02-25 NOTE — PROGRESS NOTES
"Spoke with Doctor Soriano about possible endoscopy for tomorrow. Instructed patient of the need for NPO after midnight. Patient states " i am refusing the test for tomorrow. I dont want to be NPO after midnight."   I called Dr soriano. Doctor states " if she is thinking about test then she needs to be NPO if she is totally refusing test. Then she does not." no new orders.   "

## 2017-02-25 NOTE — PROGRESS NOTES
Dr Gonzalez would like to make sure pt goes home with a prescription for thiamine and folic acid and ativan. He wants her to follow up with him in office to schedule an appt for an outpatient egd. And he has encouraged pt to seek help from AA or an inpatient alcohol program.

## 2017-02-25 NOTE — PROGRESS NOTES
Progress Note  Hospital Medicine  Patient Name:Sophia Edward  MRN:  [id number]  Patient Class: OP- Observation  Admit Date: 2/23/2017  Length of Stay: 0 days  Expected Discharge Date:   Attending Physician: Susy Johnson MD  Primary Care Provider:  Ammon Santos MD    SUBJECTIVE:     Principal Problem: Gastroenteritis  Initial history of present illness:Patient is a 57 y.o. female admitted to Hospitalist Service from Dr. Santos's office with complaint of nausea, vomiting and diarrhea for 6 days. Patient reportedly has past medical history significant for GERD, hypertension, hyponatremia (chronic) and MVP. Patient reported 10-20 loose watery bowel movements. Patient stated, her abdomen states distended. No melena, bleeding per rectum, use of NSAIDs or antibiotics reported recently. No sick contact or recent travel history. Patient reports frequent nausea and episode of emesis, on an average twice daily for the past week. No fever or chills. Patient denied chest pain, shortness of breath, abdominal pain, headache, vision changes, focal neuro-deficits, cough or fever.    PMH/PSH/SH/FH/Meds: reviewed.    Symptoms/Review of Systems:  diarrhea. Reports drinking 3 drinks a night. No shortness of breath, cough, chest pain or headache, fever or abdominal pain.     Diet:  Adequate intake.    Activity level: Normal.    Pain:  Patient reports no pain.       OBJECTIVE:   Vital Signs (Most Recent):      Temp: 97.9 °F (36.6 °C) (02/25/17 1200)  Pulse: 70 (02/25/17 1200)  Resp: 18 (02/25/17 1200)  BP: 132/80 (02/25/17 1200)  SpO2: 97 % (02/25/17 1200)       Vital Signs Range (Last 24H):  Temp:  [97.4 °F (36.3 °C)-98.3 °F (36.8 °C)]   Pulse:  [61-70]   Resp:  [17-20]   BP: (132-138)/(68-84)   SpO2:  [95 %-97 %]     Weight: 67.6 kg (149 lb)  Body mass index is 28.15 kg/(m^2).    Intake/Output Summary (Last 24 hours) at 02/25/17 1424  Last data filed at 02/25/17 1400   Gross per 24 hour   Intake          4318.96 ml   Output                 0 ml   Net          4318.96 ml     Physical Examination:  General appearance: well developed, appears stated age  Head: normocephalic, atraumatic  Eyes: conjunctivae/corneas clear. PERRL.  Nose: Nares normal. Septum midline.  Throat: lips, mucosa, and tongue normal; teeth and gums normal, no throat erythema.  Neck: supple, symmetrical, trachea midline, no JVD and thyroid not enlarged, symmetric, no tenderness/mass/nodules  Lungs: clear to auscultation bilaterally and normal respiratory effort  Chest wall: no tenderness  Heart: regular rate and rhythm, S1, S2 normal, no murmur, click, rub or gallop  Abdomen: soft, non-tender non-distented; bowel sounds normal; Enlarged liver and spleen noted - non-tender.  Extremities: no cyanosis, clubbing or edema.   Pulses: 2+ and symmetric  Skin: Skin color, texture, turgor normal. No rashes or lesions.  Lymph nodes: Cervical, supraclavicular, and axillary nodes normal.  Neurologic: Normal strength and tone. No focal numbness or weakness. CNII-XII intact.       CBC:    Recent Labs  Lab 02/23/17  1604 02/24/17  0523 02/25/17  0325   WBC 11.70 7.90 7.00   RBC 4.51 3.91* 3.88*   HGB 15.0 13.3 13.0   HCT 45.3 39.2 38.9    154 147*   * 100* 100*   MCH 33.3* 33.9* 33.5*   MCHC 33.1 33.8 33.4   BMP    Recent Labs  Lab 02/23/17  1604 02/24/17  0523 02/25/17  0326   * 86 80   * 132* 135*   K 4.3 3.8 3.6   CL 93* 95 101   CO2 22* 24 23   BUN 2* 2* 2*   CREATININE 0.7 0.6 0.6   CALCIUM 8.7 7.9* 7.2*   MG  --  0.7* 1.3*      Diagnostic Results:  Microbiology Results (last 7 days)     Procedure Component Value Units Date/Time    E. coli 0157 antigen [[id number]] Collected:  02/23/17 0456    Order Status:  Completed Specimen:  Stool from Stool Updated:  02/24/17 1403     Shiga Toxin 1 E.coli Negative     Shiga Toxin 2 E.coli Negative    C Diff Toxin by PCR [[id number]] Collected:  02/23/17 3976    Order Status:  Completed Updated:  02/24/17 1151     C. diff PCR  Negative    Clostridium difficile EIA [[id number]]  (Abnormal) Collected:  02/23/17 1736    Order Status:  Completed Specimen:  Stool from Stool Updated:  02/24/17 0206     C. diff Antigen Positive (A)     C difficile Toxins A+B, EIA Negative      Testing not recommended for children <24 months old.       Stool culture [[id number]] Collected:  02/23/17 1736    Order Status:  Sent Specimen:  Stool from Stool Updated:  02/23/17 2101         Chest X-Ray: Negative chest.  No significant change    US abdomen:  1.  Mild sludge within the gallbladder which exhibits a mildly thickened wall and mild pericholecystic fluid.  The pericholecystic fluid is nonspecific given the more generalized mild perihepatic free fluid.  Please correlate clinically for findings of acute cholecystitis.  2.  Hepatomegaly with findings suggesting fat infiltration and nodular contour suggesting cirrhosis.  3.  Normal size spleen.    Assessment/Plan:     * Acute Gastroenteritis  Follow Dr. Gonzalez' recommendations.   Obtain KUB reviewed. US reviewed. HIDA scan non diagnostic due to claustrophobia.   Check stool for C. Diff neg.    flagyl and cipro.   Continue IVF hydration.   Use IV anti-emetics as needed.     Nausea & vomiting  Supportive care.  Use anti-emetics as needed.  Continue IVF hydration.    Hypertension  Chronic problem. Will continue chronic medications and monitor for any changes, adjusting as needed.    GERD (gastroesophageal reflux disease)  Continue PPI.      Mitral valve prolapse  Tele-monitoring.      Hyponatremia - Chronic  Check TSH.  Follow serum lytes.    Discussed with patient and family regarding the importance of not drinking alcohol. Updated her on the plan of care. Answered all questions. Time spent in care of the patient, counseling and coordination of care (Greater than 50% spent in direct face to face contact): 25 min    Dispo: Follow GI recs. Possible endoscopy at later date.     VTE Risk Mitigation         Ordered      enoxaparin injection 40 mg  Daily     Route:  Subcutaneous        02/23/17 1637     Medium Risk of VTE  Once      02/23/17 1637        Antonino Holcomb MD  Department of Hospital Medicine   [facility]

## 2017-02-26 LAB
ALBUMIN SERPL BCP-MCNC: 2.5 G/DL
ALP SERPL-CCNC: 146 U/L
ALT SERPL W/O P-5'-P-CCNC: 10 U/L
ANION GAP SERPL CALC-SCNC: 9 MMOL/L
AST SERPL-CCNC: 54 U/L
BASOPHILS # BLD AUTO: 0 K/UL
BASOPHILS NFR BLD: 0.7 %
BILIRUB SERPL-MCNC: 1.4 MG/DL
BUN SERPL-MCNC: <2 MG/DL
CALCIUM SERPL-MCNC: 6.9 MG/DL
CHLORIDE SERPL-SCNC: 107 MMOL/L
CO2 SERPL-SCNC: 23 MMOL/L
CREAT SERPL-MCNC: 0.6 MG/DL
DIFFERENTIAL METHOD: ABNORMAL
EOSINOPHIL # BLD AUTO: 0 K/UL
EOSINOPHIL NFR BLD: 0.5 %
ERYTHROCYTE [DISTWIDTH] IN BLOOD BY AUTOMATED COUNT: 14.4 %
EST. GFR  (AFRICAN AMERICAN): >60 ML/MIN/1.73 M^2
EST. GFR  (NON AFRICAN AMERICAN): >60 ML/MIN/1.73 M^2
GLUCOSE SERPL-MCNC: 88 MG/DL
HCT VFR BLD AUTO: 37.8 %
HGB BLD-MCNC: 12.8 G/DL
LYMPHOCYTES # BLD AUTO: 1.7 K/UL
LYMPHOCYTES NFR BLD: 25.8 %
MAGNESIUM SERPL-MCNC: 1.2 MG/DL
MCH RBC QN AUTO: 34.1 PG
MCHC RBC AUTO-ENTMCNC: 33.7 %
MCV RBC AUTO: 101 FL
MONOCYTES # BLD AUTO: 0.6 K/UL
MONOCYTES NFR BLD: 9.7 %
NEUTROPHILS # BLD AUTO: 4.2 K/UL
NEUTROPHILS NFR BLD: 63.3 %
PHOSPHATE SERPL-MCNC: 2.6 MG/DL
PLATELET # BLD AUTO: 145 K/UL
PMV BLD AUTO: 8.6 FL
POTASSIUM SERPL-SCNC: 3.3 MMOL/L
PROT SERPL-MCNC: 5.6 G/DL
RBC # BLD AUTO: 3.74 M/UL
SODIUM SERPL-SCNC: 139 MMOL/L
WBC # BLD AUTO: 6.6 K/UL

## 2017-02-26 PROCEDURE — 83735 ASSAY OF MAGNESIUM: CPT

## 2017-02-26 PROCEDURE — 36415 COLL VENOUS BLD VENIPUNCTURE: CPT

## 2017-02-26 PROCEDURE — 63600175 PHARM REV CODE 636 W HCPCS: Performed by: INTERNAL MEDICINE

## 2017-02-26 PROCEDURE — 12000002 HC ACUTE/MED SURGE SEMI-PRIVATE ROOM

## 2017-02-26 PROCEDURE — 25000003 PHARM REV CODE 250: Performed by: NURSE PRACTITIONER

## 2017-02-26 PROCEDURE — 25000003 PHARM REV CODE 250: Performed by: INTERNAL MEDICINE

## 2017-02-26 PROCEDURE — 85025 COMPLETE CBC W/AUTO DIFF WBC: CPT

## 2017-02-26 PROCEDURE — 80053 COMPREHEN METABOLIC PANEL: CPT

## 2017-02-26 PROCEDURE — 84100 ASSAY OF PHOSPHORUS: CPT

## 2017-02-26 RX ORDER — CALCIUM CARBONATE 500(1250)
1000 TABLET ORAL ONCE
Status: COMPLETED | OUTPATIENT
Start: 2017-02-26 | End: 2017-02-26

## 2017-02-26 RX ADMIN — ATENOLOL 100 MG: 50 TABLET ORAL at 09:02

## 2017-02-26 RX ADMIN — PANTOPRAZOLE SODIUM 40 MG: 40 TABLET, DELAYED RELEASE ORAL at 08:02

## 2017-02-26 RX ADMIN — HYDROCODONE BITARTRATE AND ACETAMINOPHEN 1 TABLET: 5; 325 TABLET ORAL at 06:02

## 2017-02-26 RX ADMIN — FOLIC ACID 1 MG: 1 TABLET ORAL at 08:02

## 2017-02-26 RX ADMIN — METRONIDAZOLE 500 MG: 500 TABLET ORAL at 09:02

## 2017-02-26 RX ADMIN — CIPROFLOXACIN HYDROCHLORIDE 500 MG: 500 TABLET, FILM COATED ORAL at 08:02

## 2017-02-26 RX ADMIN — TRAZODONE HYDROCHLORIDE 50 MG: 50 TABLET ORAL at 09:02

## 2017-02-26 RX ADMIN — METRONIDAZOLE 500 MG: 500 TABLET ORAL at 06:02

## 2017-02-26 RX ADMIN — NICOTINE 1 PATCH: 14 PATCH, EXTENDED RELEASE TRANSDERMAL at 08:02

## 2017-02-26 RX ADMIN — SODIUM CHLORIDE: 0.9 INJECTION, SOLUTION INTRAVENOUS at 05:02

## 2017-02-26 RX ADMIN — METRONIDAZOLE 500 MG: 500 TABLET ORAL at 02:02

## 2017-02-26 RX ADMIN — CALCIUM 1000 MG: 500 TABLET ORAL at 02:02

## 2017-02-26 RX ADMIN — LACTOBACILLUS ACIDOPHILUS / LACTOBACILLUS BULGARICUS 1 EACH: 100 MILLION CFU STRENGTH GRANULES at 08:02

## 2017-02-26 RX ADMIN — THIAMINE HCL TAB 100 MG 100 MG: 100 TAB at 09:02

## 2017-02-26 RX ADMIN — CIPROFLOXACIN HYDROCHLORIDE 500 MG: 500 TABLET, FILM COATED ORAL at 09:02

## 2017-02-26 RX ADMIN — THIAMINE HCL TAB 100 MG 100 MG: 100 TAB at 08:02

## 2017-02-26 RX ADMIN — HYDROCODONE BITARTRATE AND ACETAMINOPHEN 1 TABLET: 5; 325 TABLET ORAL at 09:02

## 2017-02-26 RX ADMIN — LACTOBACILLUS ACIDOPHILUS / LACTOBACILLUS BULGARICUS 1 EACH: 100 MILLION CFU STRENGTH GRANULES at 09:02

## 2017-02-26 RX ADMIN — IRBESARTAN 150 MG: 150 TABLET ORAL at 08:02

## 2017-02-26 RX ADMIN — CHOLESTYRAMINE 4 G: 4 POWDER, FOR SUSPENSION ORAL at 08:02

## 2017-02-26 RX ADMIN — ENOXAPARIN SODIUM 40 MG: 100 INJECTION SUBCUTANEOUS at 11:02

## 2017-02-26 RX ADMIN — HYDROCODONE BITARTRATE AND ACETAMINOPHEN 1 TABLET: 5; 325 TABLET ORAL at 02:02

## 2017-02-26 RX ADMIN — FOLIC ACID 1 MG: 1 TABLET ORAL at 09:02

## 2017-02-26 NOTE — PROGRESS NOTES
Progress Note  Hospital Medicine  Patient Name:Sophia Edward  MRN:  [id number]  Patient Class: OP- Observation  Admit Date: 2/23/2017  Length of Stay: 0 days  Expected Discharge Date:   Attending Physician: Susy Johnson MD  Primary Care Provider:  Ammon Santos MD    SUBJECTIVE:     Principal Problem: Gastroenteritis  Initial history of present illness:Patient is a 57 y.o. female admitted to Hospitalist Service from Dr. Santos's office with complaint of nausea, vomiting and diarrhea for 6 days. Patient reportedly has past medical history significant for GERD, hypertension, hyponatremia (chronic) and MVP. Patient reported 10-20 loose watery bowel movements. Patient stated, her abdomen states distended. No melena, bleeding per rectum, use of NSAIDs or antibiotics reported recently. No sick contact or recent travel history. Patient reports frequent nausea and episode of emesis, on an average twice daily for the past week. No fever or chills. Patient denied chest pain, shortness of breath, abdominal pain, headache, vision changes, focal neuro-deficits, cough or fever.    PMH/PSH/SH/FH/Meds: reviewed.    Symptoms/Review of Systems:   She had another few episode of diarrhea overnight. No shortness of breath, cough, chest pain or headache, fever or abdominal pain.     Diet:  Adequate intake.    Activity level: Normal.    Pain:  Patient reports no pain.       OBJECTIVE:   Vital Signs (Most Recent):      Temp: 97.5 °F (36.4 °C) (02/26/17 1122)  Pulse: 65 (02/26/17 1122)  Resp: 18 (02/26/17 1122)  BP: 139/67 (02/26/17 1122)  SpO2: 98 % (02/26/17 1122)       Vital Signs Range (Last 24H):  Temp:  [97.5 °F (36.4 °C)-98.4 °F (36.9 °C)]   Pulse:  [59-66]   Resp:  [16-20]   BP: (133-162)/(67-84)   SpO2:  [97 %-98 %]     Weight: 67.6 kg (149 lb)  Body mass index is 28.15 kg/(m^2).    Intake/Output Summary (Last 24 hours) at 02/26/17 1314  Last data filed at 02/26/17 0600   Gross per 24 hour   Intake             3370 ml   Output                 0 ml   Net             3370 ml     Physical Examination:  General appearance: well developed, appears stated age  Head: normocephalic, atraumatic  Eyes: conjunctivae/corneas clear. PERRL.  Nose: Nares normal. Septum midline.  Throat: lips, mucosa, and tongue normal; teeth and gums normal, no throat erythema.  Neck: supple, symmetrical, trachea midline, no JVD and thyroid not enlarged, symmetric, no tenderness/mass/nodules  Lungs: clear to auscultation bilaterally and normal respiratory effort  Chest wall: no tenderness  Heart: regular rate and rhythm, S1, S2 normal, no murmur, click, rub or gallop  Abdomen: soft, non-tender non-distented; bowel sounds hypoactive; Enlarged liver and spleen noted - non-tender.   Extremities: no cyanosis, clubbing or edema.   Pulses: 2+ and symmetric  Skin: Skin color, texture, turgor normal. No rashes or lesions.  Lymph nodes: Cervical, supraclavicular, and axillary nodes normal.  Neurologic: Normal strength and tone. No focal numbness or weakness. CNII-XII intact.       CBC:    Recent Labs  Lab 02/24/17  0523 02/25/17  0325 02/26/17  0551   WBC 7.90 7.00 6.60   RBC 3.91* 3.88* 3.74*   HGB 13.3 13.0 12.8   HCT 39.2 38.9 37.8    147* 145*   * 100* 101*   MCH 33.9* 33.5* 34.1*   MCHC 33.8 33.4 33.7   BMP    Recent Labs  Lab 02/24/17  0523 02/25/17  0326 02/26/17  0551   GLU 86 80 88   * 135* 139   K 3.8 3.6 3.3*   CL 95 101 107   CO2 24 23 23   BUN 2* 2* <2*   CREATININE 0.6 0.6 0.6   CALCIUM 7.9* 7.2* 6.9*   MG 0.7* 1.3* 1.2*      Diagnostic Results:  Microbiology Results (last 7 days)     Procedure Component Value Units Date/Time    Stool culture [[id number]] Collected:  02/23/17 1736    Order Status:  Completed Specimen:  Stool from Stool Updated:  02/25/17 1434     Stool Culture Nothing significant to date    E. coli 0157 antigen [[id number]] Collected:  02/23/17 1732    Order Status:  Completed Specimen:  Stool from Stool Updated:  02/24/17 7912      Shiga Toxin 1 E.coli Negative     Shiga Toxin 2 E.coli Negative    C Diff Toxin by PCR [[id number]] Collected:  02/23/17 1736    Order Status:  Completed Updated:  02/24/17 1151     C. diff PCR Negative    Clostridium difficile EIA [[id number]]  (Abnormal) Collected:  02/23/17 1736    Order Status:  Completed Specimen:  Stool from Stool Updated:  02/24/17 0206     C. diff Antigen Positive (A)     C difficile Toxins A+B, EIA Negative      Testing not recommended for children <24 months old.            Chest X-Ray: Negative chest.  No significant change    US abdomen:  1.  Mild sludge within the gallbladder which exhibits a mildly thickened wall and mild pericholecystic fluid.  The pericholecystic fluid is nonspecific given the more generalized mild perihepatic free fluid.  Please correlate clinically for findings of acute cholecystitis.  2.  Hepatomegaly with findings suggesting fat infiltration and nodular contour suggesting cirrhosis.  3.  Normal size spleen.    Assessment/Plan:     * Acute Gastroenteritis. Diarrhea.   Follow Dr. Gonzalez' recommendations.    KUB reviewed. US reviewed. HIDA scan non diagnostic due to claustrophobia.   Check stool for C. Diff neg.    flagyl and cipro.   Continue IVF hydration.   Use IV anti-emetics as needed.     Nausea & vomiting  Supportive care.  Use anti-emetics as needed.  Continue IVF hydration.    Hypertension  Chronic problem. Will continue chronic medications and monitor for any changes, adjusting as needed.    GERD (gastroesophageal reflux disease)  Continue PPI.      Mitral valve prolapse  Tele-monitoring.      Hyponatremia - Chronic  Hypocalcemia. Corrected calcium for albumin is close to normal range.   Check TSH.  Follow serum lytes. Replace electrolytes prn.      Time spent in care of the patient, counseling and coordination of care (Greater than 50% spent in direct face to face contact): 25 min    Dispo: Follow GI recs. Possible endoscopy at later date.     VTE Risk  Mitigation         Ordered     enoxaparin injection 40 mg  Daily     Route:  Subcutaneous        02/23/17 1637     Medium Risk of VTE  Once      02/23/17 1637        Antonino Holcomb MD  Department of Hospital Medicine   [facility]

## 2017-02-26 NOTE — PLAN OF CARE
Problem: Patient Care Overview  Goal: Plan of Care Review  POC discussed with pt, she verbalized understanding to same. Q2hour rounding/safety checks utilized. Pt. Able to verbalize her needs. Denies N/V. VSS. Minimal complaints of pain, well controlled with po pain meds. Remains free from injury. Call light in reach. Bed low and locked. Nonskid socks when oob.

## 2017-02-26 NOTE — PLAN OF CARE
Problem: Patient Care Overview  Goal: Plan of Care Review  Outcome: Ongoing (interventions implemented as appropriate)  Pt progressing.  Still with loose stool but less.  Tolerating cholestyramine.  Pain better and controled with PRN PO medication.  Ambulates with out difficulty.  Remains free from injury.  Skin intact.  No n/v today.  POC discussed, pt verbalized understanding.  Will continue to monitor.

## 2017-02-27 LAB
ALBUMIN SERPL BCP-MCNC: 2.5 G/DL
ALP SERPL-CCNC: 151 U/L
ALT SERPL W/O P-5'-P-CCNC: 11 U/L
ANION GAP SERPL CALC-SCNC: 9 MMOL/L
AST SERPL-CCNC: 66 U/L
BACTERIA STL CULT: NORMAL
BASOPHILS # BLD AUTO: 0 K/UL
BASOPHILS NFR BLD: 0.6 %
BILIRUB SERPL-MCNC: 1.4 MG/DL
BUN SERPL-MCNC: <2 MG/DL
CALCIUM SERPL-MCNC: 6.8 MG/DL
CHLORIDE SERPL-SCNC: 106 MMOL/L
CO2 SERPL-SCNC: 22 MMOL/L
CREAT SERPL-MCNC: 0.6 MG/DL
CRYPTOSP AG STL QL IA: NEGATIVE
DIFFERENTIAL METHOD: ABNORMAL
EOSINOPHIL # BLD AUTO: 0 K/UL
EOSINOPHIL NFR BLD: 0.2 %
ERYTHROCYTE [DISTWIDTH] IN BLOOD BY AUTOMATED COUNT: 14.9 %
EST. GFR  (AFRICAN AMERICAN): >60 ML/MIN/1.73 M^2
EST. GFR  (NON AFRICAN AMERICAN): >60 ML/MIN/1.73 M^2
G LAMBLIA AG STL QL IA: NEGATIVE
GLUCOSE SERPL-MCNC: 97 MG/DL
HCT VFR BLD AUTO: 38.1 %
HGB BLD-MCNC: 12.8 G/DL
LYMPHOCYTES # BLD AUTO: 1.4 K/UL
LYMPHOCYTES NFR BLD: 19.9 %
MAGNESIUM SERPL-MCNC: 0.9 MG/DL
MCH RBC QN AUTO: 33.9 PG
MCHC RBC AUTO-ENTMCNC: 33.6 %
MCV RBC AUTO: 101 FL
MONOCYTES # BLD AUTO: 0.7 K/UL
MONOCYTES NFR BLD: 10.2 %
NEUTROPHILS # BLD AUTO: 4.8 K/UL
NEUTROPHILS NFR BLD: 69.1 %
O+P STL TRI STN: NORMAL
PHOSPHATE SERPL-MCNC: 2.6 MG/DL
PLATELET # BLD AUTO: 152 K/UL
PMV BLD AUTO: 8.4 FL
POTASSIUM SERPL-SCNC: 3.4 MMOL/L
PROT SERPL-MCNC: 5.6 G/DL
RBC # BLD AUTO: 3.77 M/UL
SODIUM SERPL-SCNC: 137 MMOL/L
WBC # BLD AUTO: 6.9 K/UL

## 2017-02-27 PROCEDURE — 12000002 HC ACUTE/MED SURGE SEMI-PRIVATE ROOM

## 2017-02-27 PROCEDURE — 25000003 PHARM REV CODE 250: Performed by: INTERNAL MEDICINE

## 2017-02-27 PROCEDURE — 25000003 PHARM REV CODE 250: Performed by: EMERGENCY MEDICINE

## 2017-02-27 PROCEDURE — 83735 ASSAY OF MAGNESIUM: CPT

## 2017-02-27 PROCEDURE — 80053 COMPREHEN METABOLIC PANEL: CPT

## 2017-02-27 PROCEDURE — 85025 COMPLETE CBC W/AUTO DIFF WBC: CPT

## 2017-02-27 PROCEDURE — 25000003 PHARM REV CODE 250: Performed by: NURSE PRACTITIONER

## 2017-02-27 PROCEDURE — 99232 SBSQ HOSP IP/OBS MODERATE 35: CPT | Mod: ,,, | Performed by: INTERNAL MEDICINE

## 2017-02-27 PROCEDURE — 36415 COLL VENOUS BLD VENIPUNCTURE: CPT

## 2017-02-27 PROCEDURE — 63600175 PHARM REV CODE 636 W HCPCS: Performed by: INTERNAL MEDICINE

## 2017-02-27 PROCEDURE — 63600175 PHARM REV CODE 636 W HCPCS: Performed by: EMERGENCY MEDICINE

## 2017-02-27 PROCEDURE — 84100 ASSAY OF PHOSPHORUS: CPT

## 2017-02-27 RX ORDER — CIPROFLOXACIN 2 MG/ML
400 INJECTION, SOLUTION INTRAVENOUS
Status: DISCONTINUED | OUTPATIENT
Start: 2017-02-27 | End: 2017-03-04

## 2017-02-27 RX ORDER — METRONIDAZOLE 500 MG/100ML
500 INJECTION, SOLUTION INTRAVENOUS
Status: DISCONTINUED | OUTPATIENT
Start: 2017-02-27 | End: 2017-03-04

## 2017-02-27 RX ORDER — POTASSIUM CHLORIDE 20 MEQ/1
40 TABLET, EXTENDED RELEASE ORAL ONCE
Status: COMPLETED | OUTPATIENT
Start: 2017-02-27 | End: 2017-02-27

## 2017-02-27 RX ORDER — MAGNESIUM SULFATE/D5W 2 G/50 ML
2 INTRAVENOUS SOLUTION, PIGGYBACK (ML) INTRAVENOUS ONCE
Status: COMPLETED | OUTPATIENT
Start: 2017-02-27 | End: 2017-02-27

## 2017-02-27 RX ORDER — ALPRAZOLAM 0.25 MG/1
0.5 TABLET ORAL 3 TIMES DAILY PRN
Status: DISCONTINUED | OUTPATIENT
Start: 2017-02-27 | End: 2017-03-05 | Stop reason: HOSPADM

## 2017-02-27 RX ADMIN — TRAZODONE HYDROCHLORIDE 50 MG: 50 TABLET ORAL at 08:02

## 2017-02-27 RX ADMIN — CHOLESTYRAMINE 4 G: 4 POWDER, FOR SUSPENSION ORAL at 09:02

## 2017-02-27 RX ADMIN — NICOTINE 1 PATCH: 14 PATCH, EXTENDED RELEASE TRANSDERMAL at 08:02

## 2017-02-27 RX ADMIN — LACTOBACILLUS ACIDOPHILUS / LACTOBACILLUS BULGARICUS 1 EACH: 100 MILLION CFU STRENGTH GRANULES at 08:02

## 2017-02-27 RX ADMIN — HYDROCODONE BITARTRATE AND ACETAMINOPHEN 1 TABLET: 5; 325 TABLET ORAL at 05:02

## 2017-02-27 RX ADMIN — HYDROCODONE BITARTRATE AND ACETAMINOPHEN 1 TABLET: 5; 325 TABLET ORAL at 01:02

## 2017-02-27 RX ADMIN — ENOXAPARIN SODIUM 40 MG: 100 INJECTION SUBCUTANEOUS at 12:02

## 2017-02-27 RX ADMIN — THIAMINE HCL TAB 100 MG 100 MG: 100 TAB at 08:02

## 2017-02-27 RX ADMIN — CIPROFLOXACIN 400 MG: 2 INJECTION, SOLUTION INTRAVENOUS at 08:02

## 2017-02-27 RX ADMIN — HYDROCODONE BITARTRATE AND ACETAMINOPHEN 1 TABLET: 5; 325 TABLET ORAL at 06:02

## 2017-02-27 RX ADMIN — THIAMINE HCL TAB 100 MG 100 MG: 100 TAB at 10:02

## 2017-02-27 RX ADMIN — CHOLESTYRAMINE 4 G: 4 POWDER, FOR SUSPENSION ORAL at 10:02

## 2017-02-27 RX ADMIN — IRBESARTAN 150 MG: 150 TABLET ORAL at 08:02

## 2017-02-27 RX ADMIN — ALPRAZOLAM 0.5 MG: 0.25 TABLET ORAL at 10:02

## 2017-02-27 RX ADMIN — ONDANSETRON 4 MG: 2 INJECTION INTRAMUSCULAR; INTRAVENOUS at 01:02

## 2017-02-27 RX ADMIN — POTASSIUM CHLORIDE 40 MEQ: 20 TABLET, EXTENDED RELEASE ORAL at 12:02

## 2017-02-27 RX ADMIN — FOLIC ACID 1 MG: 1 TABLET ORAL at 08:02

## 2017-02-27 RX ADMIN — CIPROFLOXACIN HYDROCHLORIDE 500 MG: 500 TABLET, FILM COATED ORAL at 08:02

## 2017-02-27 RX ADMIN — ATENOLOL 100 MG: 50 TABLET ORAL at 08:02

## 2017-02-27 RX ADMIN — FOLIC ACID 1 MG: 1 TABLET ORAL at 10:02

## 2017-02-27 RX ADMIN — METRONIDAZOLE 500 MG: 500 TABLET ORAL at 05:02

## 2017-02-27 RX ADMIN — PANTOPRAZOLE SODIUM 40 MG: 40 TABLET, DELAYED RELEASE ORAL at 08:02

## 2017-02-27 RX ADMIN — Medication 2 G: at 05:02

## 2017-02-27 RX ADMIN — METRONIDAZOLE 500 MG: 500 INJECTION, SOLUTION INTRAVENOUS at 10:02

## 2017-02-27 NOTE — PLAN OF CARE
Problem: Patient Care Overview  Goal: Plan of Care Review  Outcome: Ongoing (interventions implemented as appropriate)  Pt progressing. VVS. Tolerating cholestyramine. Pain better and controled with PRN PO medication. Ambulates with out difficulty. Remains free from injury. Skin intact. No n/v today. POC discussed, pt verbalized understanding. Will continue to monitor.

## 2017-02-27 NOTE — PROGRESS NOTES
Progress Note  Hospital Medicine  Patient Name:Sophia Edward  MRN:  [id number]  Patient Class: IP- Inpatient  Admit Date: 2/23/2017  Length of Stay: 1 days  Expected Discharge Date:   Attending Physician: Susy Johnson MD  Primary Care Provider:  Ammon Santos MD    SUBJECTIVE:     Principal Problem: Gastroenteritis  Initial history of present illness:Patient is a 57 y.o. female admitted to Hospitalist Service from Dr. Santos's office with complaint of nausea, vomiting and diarrhea for 6 days. Patient reportedly has past medical history significant for GERD, hypertension, hyponatremia (chronic) and MVP. Patient reported 10-20 loose watery bowel movements. Patient stated, her abdomen states distended. No melena, bleeding per rectum, use of NSAIDs or antibiotics reported recently. No sick contact or recent travel history. Patient reports frequent nausea and episode of emesis, on an average twice daily for the past week. No fever or chills. Patient denied chest pain, shortness of breath, abdominal pain, headache, vision changes, focal neuro-deficits, cough or fever.    PMH/PSH/SH/FH/Meds: reviewed.    Symptoms/Review of Systems: Complaining of nausea. No shortness of breath, cough, chest pain or headache, fever or abdominal pain.     Diet:  Adequate intake.    Activity level: Normal.    Pain:  Patient reports no pain.       OBJECTIVE:   Vital Signs (Most Recent):      Temp: 98.4 °F (36.9 °C) (02/27/17 0400)  Pulse: 67 (02/27/17 0400)  Resp: 18 (02/27/17 0400)  BP: (!) 140/70 (02/27/17 0400)  SpO2: 98 % (02/27/17 0400)       Vital Signs Range (Last 24H):  Temp:  [97.8 °F (36.6 °C)-98.5 °F (36.9 °C)]   Pulse:  [66-69]   Resp:  [18]   BP: (140-154)/(64-75)   SpO2:  [97 %-98 %]     Weight: 74.9 kg (165 lb 2 oz)  Body mass index is 31.2 kg/(m^2).    Intake/Output Summary (Last 24 hours) at 02/27/17 1202  Last data filed at 02/26/17 1827   Gross per 24 hour   Intake             1725 ml   Output                0 ml   Net              1725 ml     Physical Examination:  General appearance: well developed, appears stated age  Head: normocephalic, atraumatic  Eyes: conjunctivae/corneas clear. PERRL.  Nose: Nares normal. Septum midline.  Throat: lips, mucosa, and tongue normal; teeth and gums normal, no throat erythema.  Neck: supple, symmetrical, trachea midline, no JVD and thyroid not enlarged, symmetric, no tenderness/mass/nodules  Lungs: clear to auscultation bilaterally and normal respiratory effort  Chest wall: no tenderness  Heart: regular rate and rhythm, S1, S2 normal, no murmur, click, rub or gallop  Abdomen: soft, non-tender non-distented; bowel sounds normal; Enlarged liver and spleen noted - non-tender.  Extremities: no cyanosis, clubbing or edema.   Pulses: 2+ and symmetric  Skin: Skin color, texture, turgor normal. No rashes or lesions.  Lymph nodes: Cervical, supraclavicular, and axillary nodes normal.  Neurologic: Normal strength and tone. No focal numbness or weakness. CNII-XII intact.       CBC:    Recent Labs  Lab 02/25/17  0325 02/26/17  0551 02/27/17  0511   WBC 7.00 6.60 6.90   RBC 3.88* 3.74* 3.77*   HGB 13.0 12.8 12.8   HCT 38.9 37.8 38.1   * 145* 152   * 101* 101*   MCH 33.5* 34.1* 33.9*   MCHC 33.4 33.7 33.6   BMP    Recent Labs  Lab 02/25/17  0326 02/26/17  0551 02/27/17  0511   GLU 80 88 97   * 139 137   K 3.6 3.3* 3.4*    107 106   CO2 23 23 22*   BUN 2* <2* <2*   CREATININE 0.6 0.6 0.6   CALCIUM 7.2* 6.9* 6.8*   MG 1.3* 1.2* 0.9*      Diagnostic Results:  Microbiology Results (last 7 days)     Procedure Component Value Units Date/Time    Stool culture [[id number]] Collected:  02/23/17 1736    Order Status:  Completed Specimen:  Stool from Stool Updated:  02/25/17 1434     Stool Culture Nothing significant to date    E. coli 0157 antigen [[id number]] Collected:  02/23/17 6736    Order Status:  Completed Specimen:  Stool from Stool Updated:  02/24/17 1403     Shiga Toxin 1 E.coli Negative      Shiga Toxin 2 E.coli Negative    C Diff Toxin by PCR [[id number]] Collected:  02/23/17 1736    Order Status:  Completed Updated:  02/24/17 1151     C. diff PCR Negative    Clostridium difficile EIA [[id number]]  (Abnormal) Collected:  02/23/17 1736    Order Status:  Completed Specimen:  Stool from Stool Updated:  02/24/17 0206     C. diff Antigen Positive (A)     C difficile Toxins A+B, EIA Negative      Testing not recommended for children <24 months old.            Chest X-Ray: Negative chest.  No significant change    US abdomen:  1.  Mild sludge within the gallbladder which exhibits a mildly thickened wall and mild pericholecystic fluid.  The pericholecystic fluid is nonspecific given the more generalized mild perihepatic free fluid.  Please correlate clinically for findings of acute cholecystitis.  2.  Hepatomegaly with findings suggesting fat infiltration and nodular contour suggesting cirrhosis.  3.  Normal size spleen.    HIDA scan: Incomplete examination secondary to patient claustrophobia, rendering it nondiagnostic for biliary obstruction/cholecystitis.  This patient's charge will be adjusted.    Assessment/Plan:     * Acute Gastroenteritis  Follow Dr. Gonzalez' recommendations.   Obtain KUB. Continue Lactinex and flagyl and Cipro.  Continue IVF hydration.   Use IV anti-emetics as needed.     Nausea & vomiting  Supportive care.  Use anti-emetics as needed.  Continue IVF hydration.    Hypertension  Chronic problem. Will continue chronic medications and monitor for any changes, adjusting as needed.    GERD (gastroesophageal reflux disease)  Continue PPI.      Mitral valve prolapse  Tele-monitoring.    Hypokalemia  Replete KCl.  Follow BMP.    Hyponatremia - Chronic  Secondary to alcoholic liver disease  Follow serum lytes.    Alcoholic liver disease with likely portal hypertension  Alcohol abstinence discussed.     VTE Risk Mitigation         Ordered     enoxaparin injection 40 mg  Daily     Route:   Subcutaneous        02/23/17 1637     Medium Risk of VTE  Once      02/23/17 1637        Edmund Valenzuela MD  Department of Hospital Medicine   [facility]

## 2017-02-28 LAB
ALBUMIN SERPL BCP-MCNC: 2.6 G/DL
ALP SERPL-CCNC: 143 U/L
ALT SERPL W/O P-5'-P-CCNC: 13 U/L
ANION GAP SERPL CALC-SCNC: 10 MMOL/L
AST SERPL-CCNC: 66 U/L
BASOPHILS # BLD AUTO: 0 K/UL
BASOPHILS NFR BLD: 0.5 %
BILIRUB SERPL-MCNC: 1.4 MG/DL
BUN SERPL-MCNC: <2 MG/DL
CALCIUM SERPL-MCNC: 6.9 MG/DL
CHLORIDE SERPL-SCNC: 105 MMOL/L
CO2 SERPL-SCNC: 21 MMOL/L
CREAT SERPL-MCNC: 0.6 MG/DL
DIFFERENTIAL METHOD: ABNORMAL
EOSINOPHIL # BLD AUTO: 0 K/UL
EOSINOPHIL NFR BLD: 0.6 %
ERYTHROCYTE [DISTWIDTH] IN BLOOD BY AUTOMATED COUNT: 15 %
EST. GFR  (AFRICAN AMERICAN): >60 ML/MIN/1.73 M^2
EST. GFR  (NON AFRICAN AMERICAN): >60 ML/MIN/1.73 M^2
GLUCOSE SERPL-MCNC: 97 MG/DL
HCT VFR BLD AUTO: 40.2 %
HGB BLD-MCNC: 13.4 G/DL
LYMPHOCYTES # BLD AUTO: 1.2 K/UL
LYMPHOCYTES NFR BLD: 15.7 %
MAGNESIUM SERPL-MCNC: 1.2 MG/DL
MCH RBC QN AUTO: 33.6 PG
MCHC RBC AUTO-ENTMCNC: 33.3 %
MCV RBC AUTO: 101 FL
MONOCYTES # BLD AUTO: 0.9 K/UL
MONOCYTES NFR BLD: 11.6 %
NEUTROPHILS # BLD AUTO: 5.3 K/UL
NEUTROPHILS NFR BLD: 71.6 %
PHOSPHATE SERPL-MCNC: 2.7 MG/DL
PLATELET # BLD AUTO: 182 K/UL
PMV BLD AUTO: 8.4 FL
POTASSIUM SERPL-SCNC: 3.3 MMOL/L
PROT SERPL-MCNC: 6.1 G/DL
RBC # BLD AUTO: 3.98 M/UL
SODIUM SERPL-SCNC: 136 MMOL/L
WBC # BLD AUTO: 7.4 K/UL

## 2017-02-28 PROCEDURE — 25000003 PHARM REV CODE 250: Performed by: INTERNAL MEDICINE

## 2017-02-28 PROCEDURE — 12000002 HC ACUTE/MED SURGE SEMI-PRIVATE ROOM

## 2017-02-28 PROCEDURE — 63600175 PHARM REV CODE 636 W HCPCS: Performed by: EMERGENCY MEDICINE

## 2017-02-28 PROCEDURE — 63600175 PHARM REV CODE 636 W HCPCS: Performed by: INTERNAL MEDICINE

## 2017-02-28 PROCEDURE — 25000003 PHARM REV CODE 250: Performed by: EMERGENCY MEDICINE

## 2017-02-28 PROCEDURE — 80053 COMPREHEN METABOLIC PANEL: CPT

## 2017-02-28 PROCEDURE — 83735 ASSAY OF MAGNESIUM: CPT

## 2017-02-28 PROCEDURE — 25000003 PHARM REV CODE 250: Performed by: NURSE PRACTITIONER

## 2017-02-28 PROCEDURE — 36415 COLL VENOUS BLD VENIPUNCTURE: CPT

## 2017-02-28 PROCEDURE — 99232 SBSQ HOSP IP/OBS MODERATE 35: CPT | Mod: ,,, | Performed by: INTERNAL MEDICINE

## 2017-02-28 PROCEDURE — 85025 COMPLETE CBC W/AUTO DIFF WBC: CPT

## 2017-02-28 PROCEDURE — 84100 ASSAY OF PHOSPHORUS: CPT

## 2017-02-28 RX ORDER — POTASSIUM CHLORIDE 20 MEQ/1
40 TABLET, EXTENDED RELEASE ORAL ONCE
Status: COMPLETED | OUTPATIENT
Start: 2017-02-28 | End: 2017-02-28

## 2017-02-28 RX ORDER — CHOLESTYRAMINE 4 G/4.8G
1 POWDER, FOR SUSPENSION ORAL EVERY 12 HOURS
Status: DISCONTINUED | OUTPATIENT
Start: 2017-02-28 | End: 2017-03-05 | Stop reason: HOSPADM

## 2017-02-28 RX ADMIN — HYDROCODONE BITARTRATE AND ACETAMINOPHEN 1 TABLET: 5; 325 TABLET ORAL at 01:02

## 2017-02-28 RX ADMIN — CHOLESTYRAMINE 4 G: 4 POWDER, FOR SUSPENSION ORAL at 09:02

## 2017-02-28 RX ADMIN — FOLIC ACID 1 MG: 1 TABLET ORAL at 07:02

## 2017-02-28 RX ADMIN — POTASSIUM CHLORIDE 40 MEQ: 20 TABLET, EXTENDED RELEASE ORAL at 09:02

## 2017-02-28 RX ADMIN — HYDROCODONE BITARTRATE AND ACETAMINOPHEN 1 TABLET: 5; 325 TABLET ORAL at 09:02

## 2017-02-28 RX ADMIN — CIPROFLOXACIN 400 MG: 2 INJECTION, SOLUTION INTRAVENOUS at 09:02

## 2017-02-28 RX ADMIN — NICOTINE 1 PATCH: 14 PATCH, EXTENDED RELEASE TRANSDERMAL at 09:02

## 2017-02-28 RX ADMIN — HYDROCODONE BITARTRATE AND ACETAMINOPHEN 1 TABLET: 5; 325 TABLET ORAL at 10:02

## 2017-02-28 RX ADMIN — PANTOPRAZOLE SODIUM 40 MG: 40 TABLET, DELAYED RELEASE ORAL at 09:02

## 2017-02-28 RX ADMIN — TRAZODONE HYDROCHLORIDE 50 MG: 50 TABLET ORAL at 09:02

## 2017-02-28 RX ADMIN — HYDROCODONE BITARTRATE AND ACETAMINOPHEN 1 TABLET: 5; 325 TABLET ORAL at 05:02

## 2017-02-28 RX ADMIN — IRBESARTAN 150 MG: 150 TABLET ORAL at 09:02

## 2017-02-28 RX ADMIN — METRONIDAZOLE 500 MG: 500 INJECTION, SOLUTION INTRAVENOUS at 03:02

## 2017-02-28 RX ADMIN — METRONIDAZOLE 500 MG: 500 INJECTION, SOLUTION INTRAVENOUS at 12:02

## 2017-02-28 RX ADMIN — THIAMINE HCL TAB 100 MG 100 MG: 100 TAB at 09:02

## 2017-02-28 RX ADMIN — LACTOBACILLUS ACIDOPHILUS / LACTOBACILLUS BULGARICUS 1 EACH: 100 MILLION CFU STRENGTH GRANULES at 09:02

## 2017-02-28 RX ADMIN — CHOLESTYRAMINE 4 G: 4 POWDER, FOR SUSPENSION ORAL at 07:02

## 2017-02-28 RX ADMIN — METRONIDAZOLE 500 MG: 500 INJECTION, SOLUTION INTRAVENOUS at 07:02

## 2017-02-28 RX ADMIN — FOLIC ACID 1 MG: 1 TABLET ORAL at 09:02

## 2017-02-28 RX ADMIN — ATENOLOL 100 MG: 50 TABLET ORAL at 09:02

## 2017-02-28 RX ADMIN — MAGNESIUM SULFATE HEPTAHYDRATE 3 G: 500 INJECTION, SOLUTION INTRAMUSCULAR; INTRAVENOUS at 12:02

## 2017-02-28 RX ADMIN — ENOXAPARIN SODIUM 40 MG: 100 INJECTION SUBCUTANEOUS at 12:02

## 2017-02-28 RX ADMIN — THIAMINE HCL TAB 100 MG 100 MG: 100 TAB at 07:02

## 2017-02-28 NOTE — PLAN OF CARE
Problem: Patient Care Overview  Goal: Plan of Care Review  Outcome: Ongoing (interventions implemented as appropriate)  Ensured patient safety with frequent checks, assessing pain and monitoring piv, iv magnesium administered and oral potassium. Patient states prn medication adequate relqief with round the clock dosing utilized. Patient remains free of falls and skin intact. Patient c/o watery loose diarrhea, Dr. Johnson notified and prn meds reviewed and discussed with the patient. Will continue to monitor.

## 2017-02-28 NOTE — PLAN OF CARE
Problem: Patient Care Overview  Goal: Plan of Care Review  Outcome: Ongoing (interventions implemented as appropriate)  Pt progressing. VVS. Tolerating cholestyramine. Pain better and controled with PRN PO medication. Ambulates with out difficulty. Remains free from injury. Skin intact. Rested well this shift. POC discussed, pt verbalized understanding. Will continue to monitor.

## 2017-02-28 NOTE — PROGRESS NOTES
Progress Note  Hospital Medicine  Patient Name:Sophia Edward  MRN:  [id number]  Patient Class: IP- Inpatient  Admit Date: 2/23/2017  Length of Stay: 2 days  Expected Discharge Date:   Attending Physician: Susy Johnson MD  Primary Care Provider:  Ammon Santos MD    SUBJECTIVE:     Principal Problem: Gastroenteritis  Initial history of present illness:Patient is a 57 y.o. female admitted to Hospitalist Service from Dr. Santos's office with complaint of nausea, vomiting and diarrhea for 6 days. Patient reportedly has past medical history significant for GERD, hypertension, hyponatremia (chronic) and MVP. Patient reported 10-20 loose watery bowel movements. Patient stated, her abdomen states distended. No melena, bleeding per rectum, use of NSAIDs or antibiotics reported recently. No sick contact or recent travel history. Patient reports frequent nausea and episode of emesis, on an average twice daily for the past week. No fever or chills. Patient denied chest pain, shortness of breath, abdominal pain, headache, vision changes, focal neuro-deficits, cough or fever.    PMH/PSH/SH/FH/Meds: reviewed.    Symptoms/Review of Systems:  Still having diarrhea, less nauseous. No shortness of breath, cough, chest pain or headache, fever or abdominal pain.     Diet:  Adequate intake.    Activity level: Normal.    Pain:  Patient reports no pain.       OBJECTIVE:   Vital Signs (Most Recent):      Temp: 98.1 °F (36.7 °C) (02/27/17 2100)  Pulse: 74 (02/27/17 2100)  Resp: 18 (02/27/17 2100)  BP: (!) 158/72 (02/27/17 2100)  SpO2: 97 % (02/27/17 2100)       Vital Signs Range (Last 24H):  Temp:  [97.7 °F (36.5 °C)-98.4 °F (36.9 °C)]   Pulse:  [65-74]   Resp:  [16-18]   BP: (140-158)/(70-94)   SpO2:  [97 %-98 %]     Weight: 74.9 kg (165 lb 2 oz)  Body mass index is 31.2 kg/(m^2).    Intake/Output Summary (Last 24 hours) at 02/28/17 1872  Last data filed at 02/27/17 1200   Gross per 24 hour   Intake              580 ml   Output                 0 ml   Net              580 ml     Physical Examination:  General appearance: well developed, appears stated age  Head: normocephalic, atraumatic  Eyes: conjunctivae/corneas clear. PERRL.  Nose: Nares normal. Septum midline.  Throat: lips, mucosa, and tongue normal; teeth and gums normal, no throat erythema.  Neck: supple, symmetrical, trachea midline, no JVD and thyroid not enlarged, symmetric, no tenderness/mass/nodules  Lungs: clear to auscultation bilaterally and normal respiratory effort  Chest wall: no tenderness  Heart: regular rate and rhythm, S1, S2 normal, no murmur, click, rub or gallop  Abdomen: soft, non-tender non-distented; bowel sounds normal; Enlarged liver and spleen noted - non-tender.  Extremities: no cyanosis, clubbing or edema.   Pulses: 2+ and symmetric  Skin: Skin color, texture, turgor normal. No rashes or lesions.  Lymph nodes: Cervical, supraclavicular, and axillary nodes normal.  Neurologic: Normal strength and tone. No focal numbness or weakness. CNII-XII intact.       CBC:    Recent Labs  Lab 02/26/17  0551 02/27/17  0511 02/28/17  0603   WBC 6.60 6.90 7.40   RBC 3.74* 3.77* 3.98*   HGB 12.8 12.8 13.4   HCT 37.8 38.1 40.2   * 152 182   * 101* 101*   MCH 34.1* 33.9* 33.6*   MCHC 33.7 33.6 33.3   BMP    Recent Labs  Lab 02/26/17  0551 02/27/17  0511 02/28/17  0603   GLU 88 97 97    137 136   K 3.3* 3.4* 3.3*    106 105   CO2 23 22* 21*   BUN <2* <2* <2*   CREATININE 0.6 0.6 0.6   CALCIUM 6.9* 6.8* 6.9*   MG 1.2* 0.9* 1.2*      Diagnostic Results:  Microbiology Results (last 7 days)     Procedure Component Value Units Date/Time    Stool culture [[id number]] Collected:  02/23/17 5400    Order Status:  Completed Specimen:  Stool from Stool Updated:  02/27/17 4338     Stool Culture No Salmonella,Shigella,Vibrio,Campylobacter,Yersinia isolated.    E. coli 0157 antigen [[id number]] Collected:  02/23/17 8136    Order Status:  Completed Specimen:  Stool from Stool  Updated:  02/24/17 1403     Shiga Toxin 1 E.coli Negative     Shiga Toxin 2 E.coli Negative    C Diff Toxin by PCR [[id number]] Collected:  02/23/17 1736    Order Status:  Completed Updated:  02/24/17 1151     C. diff PCR Negative    Clostridium difficile EIA [[id number]]  (Abnormal) Collected:  02/23/17 1736    Order Status:  Completed Specimen:  Stool from Stool Updated:  02/24/17 0206     C. diff Antigen Positive (A)     C difficile Toxins A+B, EIA Negative      Testing not recommended for children <24 months old.            Chest X-Ray: Negative chest.  No significant change    US abdomen:  1.  Mild sludge within the gallbladder which exhibits a mildly thickened wall and mild pericholecystic fluid.  The pericholecystic fluid is nonspecific given the more generalized mild perihepatic free fluid.  Please correlate clinically for findings of acute cholecystitis.  2.  Hepatomegaly with findings suggesting fat infiltration and nodular contour suggesting cirrhosis.  3.  Normal size spleen.    HIDA scan: Incomplete examination secondary to patient claustrophobia, rendering it nondiagnostic for biliary obstruction/cholecystitis.  This patient's charge will be adjusted.    KUB: Nonspecific bowel gas pattern without evidence for dilated loops to suggest bowel obstruction.  A relative paucity of air is present which may represent nondistended or fluid filled loops.    Assessment/Plan:     * Acute Gastroenteritis  Follow Dr. Gonzalez' recommendations.   Obtain KUB. Continue Lactinex and flagyl and Cipro.  Continue IVF hydration.   Use IV anti-emetics as needed.   Start Questran 4 gram po bid.    Nausea & vomiting  Supportive care.  Use anti-emetics as needed.  Continue IVF hydration.    Hypertension  Chronic problem. Will continue chronic medications and monitor for any changes, adjusting as needed.    GERD (gastroesophageal reflux disease)  Continue PPI.    Mitral valve  prolapse  Tele-monitoring.    Hypokalemia/Hypomagnesemia  Replete KCl + Mag.  Follow BMP.    Hyponatremia - Chronic  Secondary to alcoholic liver disease  Follow serum lytes.    Alcoholic liver disease with likely portal hypertension  Alcohol abstinence discussed.     VTE Risk Mitigation         Ordered     enoxaparin injection 40 mg  Daily     Route:  Subcutaneous        02/23/17 1637     Medium Risk of VTE  Once      02/23/17 1637        Susy Johnson MD  Department of Hospital Medicine   [facility]

## 2017-02-28 NOTE — PLAN OF CARE
Problem: Patient Care Overview  Goal: Plan of Care Review  Outcome: Ongoing (interventions implemented as appropriate)  Patient's VSS.  Pain has been managed with PRN pain medications.  Patient prefers to take PO medications one at a time during administration.  One emesis episode, and PRN ondansetron IV given.  Thorough medication education provided prior to administering any medications.  Patient expressed understanding of what medication was being administered.  Patient then expressed frustration because PRN medication was not administered as an ordered medication.  Education was provided and patient then expressed understanding.  Patient has remained free of falls, trauma, or injury.  POC reviewed with patient, open discussions was facilitated, and understanding was achieved.  Call light within reach, bed in lowest position, and safety precautions maintained.  Patient denies any needs at this time.

## 2017-03-01 LAB
ALBUMIN SERPL BCP-MCNC: 2.5 G/DL
ALP SERPL-CCNC: 124 U/L
ALT SERPL W/O P-5'-P-CCNC: 11 U/L
ANION GAP SERPL CALC-SCNC: 8 MMOL/L
AST SERPL-CCNC: 59 U/L
BASOPHILS # BLD AUTO: 0 K/UL
BASOPHILS NFR BLD: 0.2 %
BILIRUB SERPL-MCNC: 1.1 MG/DL
BUN SERPL-MCNC: <2 MG/DL
CALCIUM SERPL-MCNC: 6.8 MG/DL
CHLORIDE SERPL-SCNC: 107 MMOL/L
CO2 SERPL-SCNC: 22 MMOL/L
CREAT SERPL-MCNC: 0.6 MG/DL
DIFFERENTIAL METHOD: ABNORMAL
EOSINOPHIL # BLD AUTO: 0 K/UL
EOSINOPHIL NFR BLD: 0.6 %
ERYTHROCYTE [DISTWIDTH] IN BLOOD BY AUTOMATED COUNT: 14.8 %
EST. GFR  (AFRICAN AMERICAN): >60 ML/MIN/1.73 M^2
EST. GFR  (NON AFRICAN AMERICAN): >60 ML/MIN/1.73 M^2
GLUCOSE SERPL-MCNC: 98 MG/DL
HCT VFR BLD AUTO: 38.1 %
HGB BLD-MCNC: 12.7 G/DL
LYMPHOCYTES # BLD AUTO: 1.1 K/UL
LYMPHOCYTES NFR BLD: 17.2 %
MAGNESIUM SERPL-MCNC: 1.4 MG/DL
MCH RBC QN AUTO: 33.5 PG
MCHC RBC AUTO-ENTMCNC: 33.3 %
MCV RBC AUTO: 101 FL
MONOCYTES # BLD AUTO: 0.9 K/UL
MONOCYTES NFR BLD: 13.1 %
NEUTROPHILS # BLD AUTO: 4.6 K/UL
NEUTROPHILS NFR BLD: 68.9 %
PHOSPHATE SERPL-MCNC: 2.8 MG/DL
PLATELET # BLD AUTO: 169 K/UL
PMV BLD AUTO: 8.6 FL
POTASSIUM SERPL-SCNC: 3.7 MMOL/L
PROT SERPL-MCNC: 5.7 G/DL
RBC # BLD AUTO: 3.78 M/UL
SODIUM SERPL-SCNC: 137 MMOL/L
WBC # BLD AUTO: 6.6 K/UL

## 2017-03-01 PROCEDURE — 63600175 PHARM REV CODE 636 W HCPCS: Performed by: EMERGENCY MEDICINE

## 2017-03-01 PROCEDURE — 85025 COMPLETE CBC W/AUTO DIFF WBC: CPT

## 2017-03-01 PROCEDURE — 12000002 HC ACUTE/MED SURGE SEMI-PRIVATE ROOM

## 2017-03-01 PROCEDURE — 25000003 PHARM REV CODE 250: Performed by: NURSE PRACTITIONER

## 2017-03-01 PROCEDURE — 99232 SBSQ HOSP IP/OBS MODERATE 35: CPT | Mod: ,,, | Performed by: INTERNAL MEDICINE

## 2017-03-01 PROCEDURE — 25000003 PHARM REV CODE 250: Performed by: INTERNAL MEDICINE

## 2017-03-01 PROCEDURE — 83735 ASSAY OF MAGNESIUM: CPT

## 2017-03-01 PROCEDURE — 63600175 PHARM REV CODE 636 W HCPCS: Performed by: INTERNAL MEDICINE

## 2017-03-01 PROCEDURE — 25000003 PHARM REV CODE 250: Performed by: EMERGENCY MEDICINE

## 2017-03-01 PROCEDURE — 80053 COMPREHEN METABOLIC PANEL: CPT

## 2017-03-01 PROCEDURE — 84100 ASSAY OF PHOSPHORUS: CPT

## 2017-03-01 PROCEDURE — 36415 COLL VENOUS BLD VENIPUNCTURE: CPT

## 2017-03-01 RX ADMIN — IRBESARTAN 150 MG: 150 TABLET ORAL at 09:03

## 2017-03-01 RX ADMIN — HYDROCODONE BITARTRATE AND ACETAMINOPHEN 1 TABLET: 5; 325 TABLET ORAL at 02:03

## 2017-03-01 RX ADMIN — CIPROFLOXACIN 400 MG: 2 INJECTION, SOLUTION INTRAVENOUS at 09:03

## 2017-03-01 RX ADMIN — CIPROFLOXACIN 400 MG: 2 INJECTION, SOLUTION INTRAVENOUS at 11:03

## 2017-03-01 RX ADMIN — CHOLESTYRAMINE 4 G: 4 POWDER, FOR SUSPENSION ORAL at 09:03

## 2017-03-01 RX ADMIN — NICOTINE 1 PATCH: 14 PATCH, EXTENDED RELEASE TRANSDERMAL at 09:03

## 2017-03-01 RX ADMIN — TRAZODONE HYDROCHLORIDE 50 MG: 50 TABLET ORAL at 09:03

## 2017-03-01 RX ADMIN — METRONIDAZOLE 500 MG: 500 INJECTION, SOLUTION INTRAVENOUS at 05:03

## 2017-03-01 RX ADMIN — METRONIDAZOLE 500 MG: 500 INJECTION, SOLUTION INTRAVENOUS at 08:03

## 2017-03-01 RX ADMIN — ONDANSETRON 4 MG: 2 INJECTION INTRAMUSCULAR; INTRAVENOUS at 12:03

## 2017-03-01 RX ADMIN — PANTOPRAZOLE SODIUM 40 MG: 40 TABLET, DELAYED RELEASE ORAL at 09:03

## 2017-03-01 RX ADMIN — ATENOLOL 100 MG: 50 TABLET ORAL at 09:03

## 2017-03-01 RX ADMIN — THIAMINE HCL TAB 100 MG 100 MG: 100 TAB at 09:03

## 2017-03-01 RX ADMIN — SODIUM CHLORIDE: 0.9 INJECTION, SOLUTION INTRAVENOUS at 09:03

## 2017-03-01 RX ADMIN — ENOXAPARIN SODIUM 40 MG: 100 INJECTION SUBCUTANEOUS at 12:03

## 2017-03-01 RX ADMIN — HYDROCODONE BITARTRATE AND ACETAMINOPHEN 1 TABLET: 5; 325 TABLET ORAL at 06:03

## 2017-03-01 RX ADMIN — CALCIUM GLUCONATE 1000 MG: 94 INJECTION, SOLUTION INTRAVENOUS at 09:03

## 2017-03-01 RX ADMIN — ALPRAZOLAM 0.5 MG: 0.25 TABLET ORAL at 11:03

## 2017-03-01 RX ADMIN — LACTOBACILLUS ACIDOPHILUS / LACTOBACILLUS BULGARICUS 1 EACH: 100 MILLION CFU STRENGTH GRANULES at 09:03

## 2017-03-01 RX ADMIN — LACTOBACILLUS ACIDOPHILUS / LACTOBACILLUS BULGARICUS 1 EACH: 100 MILLION CFU STRENGTH GRANULES at 08:03

## 2017-03-01 RX ADMIN — FOLIC ACID 1 MG: 1 TABLET ORAL at 09:03

## 2017-03-01 RX ADMIN — HYDROCODONE BITARTRATE AND ACETAMINOPHEN 1 TABLET: 5; 325 TABLET ORAL at 07:03

## 2017-03-01 RX ADMIN — METRONIDAZOLE 500 MG: 500 INJECTION, SOLUTION INTRAVENOUS at 12:03

## 2017-03-01 RX ADMIN — MAGNESIUM SULFATE HEPTAHYDRATE 3 G: 500 INJECTION, SOLUTION INTRAMUSCULAR; INTRAVENOUS at 01:03

## 2017-03-01 NOTE — PROGRESS NOTES
Progress Note  Hospital Medicine  Patient Name:Sophia Edward  MRN:  [id number]  Patient Class: IP- Inpatient  Admit Date: 2/23/2017  Length of Stay: 3 days  Expected Discharge Date:   Attending Physician: Susy Johnson MD  Primary Care Provider:  Ammon Santos MD    SUBJECTIVE:     Principal Problem: Gastroenteritis  Initial history of present illness:Patient is a 57 y.o. female admitted to Hospitalist Service from Dr. Santos's office with complaint of nausea, vomiting and diarrhea for 6 days. Patient reportedly has past medical history significant for GERD, hypertension, hyponatremia (chronic) and MVP. Patient reported 10-20 loose watery bowel movements. Patient stated, her abdomen states distended. No melena, bleeding per rectum, use of NSAIDs or antibiotics reported recently. No sick contact or recent travel history. Patient reports frequent nausea and episode of emesis, on an average twice daily for the past week. No fever or chills. Patient denied chest pain, shortness of breath, abdominal pain, headache, vision changes, focal neuro-deficits, cough or fever.    PMH/PSH/SH/FH/Meds: reviewed.    Symptoms/Review of Systems:  Still having diarrhea, 5-8 loose watery BMs,  less nauseous. No shortness of breath, cough, chest pain or headache, fever or abdominal pain.     Diet:  Adequate intake.    Activity level: Normal.    Pain:  Patient reports no pain.       OBJECTIVE:   Vital Signs (Most Recent):      Temp: 98.3 °F (36.8 °C) (03/01/17 0800)  Pulse: 68 (03/01/17 0800)  Resp: 17 (03/01/17 0800)  BP: 139/76 (03/01/17 0800)  SpO2: 96 % (03/01/17 0800)       Vital Signs Range (Last 24H):  Temp:  [97 °F (36.1 °C)-98.5 °F (36.9 °C)]   Pulse:  [66-71]   Resp:  [17-18]   BP: (124-144)/(65-78)   SpO2:  [96 %-98 %]     Weight: 79.6 kg (175 lb 7.8 oz)  Body mass index is 33.16 kg/(m^2).    Intake/Output Summary (Last 24 hours) at 03/01/17 1019  Last data filed at 03/01/17 0629   Gross per 24 hour   Intake          7583.33 ml    Output                0 ml   Net          7583.33 ml     Physical Examination:  General appearance: well developed, appears stated age  Head: normocephalic, atraumatic  Eyes: conjunctivae/corneas clear. PERRL.  Nose: Nares normal. Septum midline.  Throat: lips, mucosa, and tongue normal; teeth and gums normal, no throat erythema.  Neck: supple, symmetrical, trachea midline, no JVD and thyroid not enlarged, symmetric, no tenderness/mass/nodules  Lungs: clear to auscultation bilaterally and normal respiratory effort  Chest wall: no tenderness  Heart: regular rate and rhythm, S1, S2 normal, no murmur, click, rub or gallop  Abdomen: soft, non-tender non-distented; bowel sounds normal; Enlarged liver and spleen noted - non-tender.  Extremities: no cyanosis, clubbing or edema.   Pulses: 2+ and symmetric  Skin: Skin color, texture, turgor normal. No rashes or lesions.  Lymph nodes: Cervical, supraclavicular, and axillary nodes normal.  Neurologic: Normal strength and tone. No focal numbness or weakness. CNII-XII intact.       CBC:    Recent Labs  Lab 02/27/17  0511 02/28/17  0603 03/01/17  0536   WBC 6.90 7.40 6.60   RBC 3.77* 3.98* 3.78*   HGB 12.8 13.4 12.7   HCT 38.1 40.2 38.1    182 169   * 101* 101*   MCH 33.9* 33.6* 33.5*   MCHC 33.6 33.3 33.3   BMP    Recent Labs  Lab 02/27/17  0511 02/28/17  0603 03/01/17  0536   GLU 97 97 98    136 137   K 3.4* 3.3* 3.7    105 107   CO2 22* 21* 22*   BUN <2* <2* <2*   CREATININE 0.6 0.6 0.6   CALCIUM 6.8* 6.9* 6.8*   MG 0.9* 1.2* 1.4*      Diagnostic Results:  Microbiology Results (last 7 days)     Procedure Component Value Units Date/Time    Stool culture [[id number]] Collected:  02/23/17 9810    Order Status:  Completed Specimen:  Stool from Stool Updated:  02/27/17 3405     Stool Culture No Salmonella,Shigella,Vibrio,Campylobacter,Yersinia isolated.    E. coli 0157 antigen [[id number]] Collected:  02/23/17 3245    Order Status:  Completed Specimen:   Stool from Stool Updated:  02/24/17 1403     Shiga Toxin 1 E.coli Negative     Shiga Toxin 2 E.coli Negative    C Diff Toxin by PCR [[id number]] Collected:  02/23/17 1736    Order Status:  Completed Updated:  02/24/17 1151     C. diff PCR Negative    Clostridium difficile EIA [[id number]]  (Abnormal) Collected:  02/23/17 1736    Order Status:  Completed Specimen:  Stool from Stool Updated:  02/24/17 0206     C. diff Antigen Positive (A)     C difficile Toxins A+B, EIA Negative      Testing not recommended for children <24 months old.            Chest X-Ray: Negative chest.  No significant change    US abdomen:  1.  Mild sludge within the gallbladder which exhibits a mildly thickened wall and mild pericholecystic fluid.  The pericholecystic fluid is nonspecific given the more generalized mild perihepatic free fluid.  Please correlate clinically for findings of acute cholecystitis.  2.  Hepatomegaly with findings suggesting fat infiltration and nodular contour suggesting cirrhosis.  3.  Normal size spleen.    HIDA scan: Incomplete examination secondary to patient claustrophobia, rendering it nondiagnostic for biliary obstruction/cholecystitis.  This patient's charge will be adjusted.    KUB: Nonspecific bowel gas pattern without evidence for dilated loops to suggest bowel obstruction.  A relative paucity of air is present which may represent nondistended or fluid filled loops.    Assessment/Plan:     * Acute Gastroenteritis  Follow Dr. Gonzalez' recommendations. RN to reconsult him.  Obtain KUB. Continue Lactinex and flagyl and Cipro.  Continue IVF hydration.   Use IV anti-emetics as needed.   Continue Questran 4 gram po bid.    Nausea & vomiting  Supportive care.  Use anti-emetics as needed.  Continue IVF hydration.    Hypertension  Chronic problem. Will continue chronic medications and monitor for any changes, adjusting as needed.    GERD (gastroesophageal reflux disease)  Continue PPI.    Mitral valve  prolapse  Tele-monitoring.    Hypokalemia/Hypomagnesemia  Replete KCl + Mag.  Follow BMP.    Hyponatremia - Chronic  Secondary to alcoholic liver disease  Follow serum lytes.    Alcoholic liver disease with likely portal hypertension  Alcohol abstinence discussed.     Discussed with patient's .    VTE Risk Mitigation         Ordered     enoxaparin injection 40 mg  Daily     Route:  Subcutaneous        02/23/17 1637     Medium Risk of VTE  Once      02/23/17 1637        Susy Johnson MD  Department of Hospital Medicine   [facility]

## 2017-03-01 NOTE — PLAN OF CARE
Problem: Patient Care Overview  Goal: Individualization & Mutuality  Outcome: Ongoing (interventions implemented as appropriate)  No falls/trauma noted this shift, IV infusing, IV antbx given, tolerating diet, pain controlled with PRN pain meds, frequent checks done q2hrs, ambulates/voids w/o diffic, takes pills a little at a time, to avoid stomach upset. Pt in bed, lowest position, call light within reach, side rails up x2. No needs noted, will continue to monitor.

## 2017-03-01 NOTE — PROGRESS NOTES
Patient c/o SOB and swollen ankles. Called Dr. Johnson regarding such. New orders to d/c IVF and monitor.

## 2017-03-02 LAB
ALBUMIN SERPL BCP-MCNC: 2.5 G/DL
ALP SERPL-CCNC: 120 U/L
ALT SERPL W/O P-5'-P-CCNC: 11 U/L
ANION GAP SERPL CALC-SCNC: 8 MMOL/L
AST SERPL-CCNC: 51 U/L
BASOPHILS # BLD AUTO: 0 K/UL
BASOPHILS NFR BLD: 0.2 %
BILIRUB SERPL-MCNC: 1.1 MG/DL
BUN SERPL-MCNC: <2 MG/DL
CALCIUM SERPL-MCNC: 7.6 MG/DL
CHLORIDE SERPL-SCNC: 106 MMOL/L
CO2 SERPL-SCNC: 21 MMOL/L
CREAT SERPL-MCNC: 0.6 MG/DL
DIFFERENTIAL METHOD: ABNORMAL
EOSINOPHIL # BLD AUTO: 0 K/UL
EOSINOPHIL NFR BLD: 0.7 %
ERYTHROCYTE [DISTWIDTH] IN BLOOD BY AUTOMATED COUNT: 15.1 %
EST. GFR  (AFRICAN AMERICAN): >60 ML/MIN/1.73 M^2
EST. GFR  (NON AFRICAN AMERICAN): >60 ML/MIN/1.73 M^2
GLUCOSE SERPL-MCNC: 98 MG/DL
HCT VFR BLD AUTO: 37.1 %
HGB BLD-MCNC: 12.5 G/DL
LYMPHOCYTES # BLD AUTO: 1.4 K/UL
LYMPHOCYTES NFR BLD: 21 %
MAGNESIUM SERPL-MCNC: 1.4 MG/DL
MCH RBC QN AUTO: 33.6 PG
MCHC RBC AUTO-ENTMCNC: 33.7 %
MCV RBC AUTO: 100 FL
MONOCYTES # BLD AUTO: 1 K/UL
MONOCYTES NFR BLD: 14.1 %
NEUTROPHILS # BLD AUTO: 4.4 K/UL
NEUTROPHILS NFR BLD: 64 %
PHOSPHATE SERPL-MCNC: 3.1 MG/DL
PLATELET # BLD AUTO: 175 K/UL
PMV BLD AUTO: 8.6 FL
POTASSIUM SERPL-SCNC: 3.4 MMOL/L
PROT SERPL-MCNC: 5.8 G/DL
RBC # BLD AUTO: 3.72 M/UL
SODIUM SERPL-SCNC: 135 MMOL/L
WBC # BLD AUTO: 6.8 K/UL

## 2017-03-02 PROCEDURE — 12000002 HC ACUTE/MED SURGE SEMI-PRIVATE ROOM

## 2017-03-02 PROCEDURE — 25000003 PHARM REV CODE 250: Performed by: EMERGENCY MEDICINE

## 2017-03-02 PROCEDURE — 25000003 PHARM REV CODE 250: Performed by: INTERNAL MEDICINE

## 2017-03-02 PROCEDURE — 80053 COMPREHEN METABOLIC PANEL: CPT

## 2017-03-02 PROCEDURE — 99233 SBSQ HOSP IP/OBS HIGH 50: CPT | Mod: ,,, | Performed by: INTERNAL MEDICINE

## 2017-03-02 PROCEDURE — 83735 ASSAY OF MAGNESIUM: CPT

## 2017-03-02 PROCEDURE — 63600175 PHARM REV CODE 636 W HCPCS: Performed by: EMERGENCY MEDICINE

## 2017-03-02 PROCEDURE — 25000003 PHARM REV CODE 250: Performed by: NURSE PRACTITIONER

## 2017-03-02 PROCEDURE — 36415 COLL VENOUS BLD VENIPUNCTURE: CPT

## 2017-03-02 PROCEDURE — 63600175 PHARM REV CODE 636 W HCPCS: Performed by: INTERNAL MEDICINE

## 2017-03-02 PROCEDURE — 85025 COMPLETE CBC W/AUTO DIFF WBC: CPT

## 2017-03-02 PROCEDURE — 84100 ASSAY OF PHOSPHORUS: CPT

## 2017-03-02 RX ORDER — POTASSIUM CHLORIDE 20 MEQ/1
40 TABLET, EXTENDED RELEASE ORAL ONCE
Status: COMPLETED | OUTPATIENT
Start: 2017-03-02 | End: 2017-03-02

## 2017-03-02 RX ORDER — LORAZEPAM 0.5 MG/1
0.5 TABLET ORAL NIGHTLY
Status: DISCONTINUED | OUTPATIENT
Start: 2017-03-02 | End: 2017-03-05 | Stop reason: HOSPADM

## 2017-03-02 RX ORDER — MAGNESIUM SULFATE/D5W 2 G/50 ML
2 INTRAVENOUS SOLUTION, PIGGYBACK (ML) INTRAVENOUS ONCE
Status: COMPLETED | OUTPATIENT
Start: 2017-03-02 | End: 2017-03-02

## 2017-03-02 RX ADMIN — TRAZODONE HYDROCHLORIDE 50 MG: 50 TABLET ORAL at 09:03

## 2017-03-02 RX ADMIN — FOLIC ACID 1 MG: 1 TABLET ORAL at 08:03

## 2017-03-02 RX ADMIN — ENOXAPARIN SODIUM 40 MG: 100 INJECTION SUBCUTANEOUS at 11:03

## 2017-03-02 RX ADMIN — THIAMINE HCL TAB 100 MG 100 MG: 100 TAB at 09:03

## 2017-03-02 RX ADMIN — METRONIDAZOLE 500 MG: 500 INJECTION, SOLUTION INTRAVENOUS at 09:03

## 2017-03-02 RX ADMIN — CIPROFLOXACIN 400 MG: 2 INJECTION, SOLUTION INTRAVENOUS at 08:03

## 2017-03-02 RX ADMIN — Medication 2 G: at 12:03

## 2017-03-02 RX ADMIN — CIPROFLOXACIN 400 MG: 2 INJECTION, SOLUTION INTRAVENOUS at 09:03

## 2017-03-02 RX ADMIN — FOLIC ACID 1 MG: 1 TABLET ORAL at 09:03

## 2017-03-02 RX ADMIN — NICOTINE 1 PATCH: 14 PATCH, EXTENDED RELEASE TRANSDERMAL at 08:03

## 2017-03-02 RX ADMIN — CHOLESTYRAMINE 4 G: 4 POWDER, FOR SUSPENSION ORAL at 08:03

## 2017-03-02 RX ADMIN — ATENOLOL 100 MG: 50 TABLET ORAL at 09:03

## 2017-03-02 RX ADMIN — HYDROCODONE BITARTRATE AND ACETAMINOPHEN 1 TABLET: 5; 325 TABLET ORAL at 03:03

## 2017-03-02 RX ADMIN — PANTOPRAZOLE SODIUM 40 MG: 40 TABLET, DELAYED RELEASE ORAL at 08:03

## 2017-03-02 RX ADMIN — HYDROCODONE BITARTRATE AND ACETAMINOPHEN 1 TABLET: 5; 325 TABLET ORAL at 08:03

## 2017-03-02 RX ADMIN — LORAZEPAM 0.5 MG: 0.5 TABLET ORAL at 11:03

## 2017-03-02 RX ADMIN — IRBESARTAN 150 MG: 150 TABLET ORAL at 08:03

## 2017-03-02 RX ADMIN — METRONIDAZOLE 500 MG: 500 INJECTION, SOLUTION INTRAVENOUS at 04:03

## 2017-03-02 RX ADMIN — METRONIDAZOLE 500 MG: 500 INJECTION, SOLUTION INTRAVENOUS at 11:03

## 2017-03-02 RX ADMIN — THIAMINE HCL TAB 100 MG 100 MG: 100 TAB at 08:03

## 2017-03-02 RX ADMIN — LACTOBACILLUS ACIDOPHILUS / LACTOBACILLUS BULGARICUS 1 EACH: 100 MILLION CFU STRENGTH GRANULES at 08:03

## 2017-03-02 RX ADMIN — CHOLESTYRAMINE 4 G: 4 POWDER, FOR SUSPENSION ORAL at 09:03

## 2017-03-02 RX ADMIN — POTASSIUM CHLORIDE 40 MEQ: 20 TABLET, EXTENDED RELEASE ORAL at 11:03

## 2017-03-02 RX ADMIN — LACTOBACILLUS ACIDOPHILUS / LACTOBACILLUS BULGARICUS 1 EACH: 100 MILLION CFU STRENGTH GRANULES at 09:03

## 2017-03-02 NOTE — PROGRESS NOTES
The patient has alcoholic liver disease, cirrhosis and portal hypertension.  The   patient came with complaints of has pedal edema.  The patient is continues to   have diarrhea.  His abdomen is distended.  There seems to be some fluid in the   abdomen.  We will check with ultrasound tomorrow and see if needs to be tapped   if the volume is a large amount.  The patient needs an upper endoscopy, which   she has refused last time and we will consider it during this admission probably   on Friday, etc.    Once again, I am grateful to Dr. Johnson for this referral.      MICHELE/  dd: 03/01/2017 22:52:34 (CST)  td: 03/01/2017 23:16:18 (CST)  Doc ID   #[id number]  Job ID #[id number]    CC:

## 2017-03-02 NOTE — PLAN OF CARE
Problem: Patient Care Overview  Goal: Plan of Care Review  Outcome: Ongoing (interventions implemented as appropriate)  Pt remained free of injury throughout shift, able to call for assistance when needed, sinus rhythm on tele, abdominal ultrasound completed, pt having frequent loose bowel movements,pain managed with prn pain medicine, vital signs stable, will continue to monitor pt.

## 2017-03-02 NOTE — PLAN OF CARE
Problem: Patient Care Overview  Goal: Plan of Care Review  Outcome: Ongoing (interventions implemented as appropriate)  Pt. Having formed stool, soft but formed. Dr. Gonzalez ordered NPO status for U/S of abd this am. Not very happy at first then she agreed it was best to get this done while she was here.  IV restarted to right hand for iv abx. Edema noted to ankles bilateral. Instructed pt to elevate them she voiced understanding but then said some excuses not to elevate feet. Abd is distented and hpoactive bowel sounds.safety maintained.

## 2017-03-02 NOTE — PROGRESS NOTES
Per Dr. Johnson, this nurse followed up with Dr. Gonzalez to verify if he will see patient inpatient or follow up outpatient. Dr. Gonzalez states he will see inpatient and to order consult; states he will see patient tonight.

## 2017-03-02 NOTE — PROGRESS NOTES
Progress Note  Hospital Medicine  Patient Name:Sophia Edward  MRN:  [id number]  Patient Class: IP- Inpatient  Admit Date: 2/23/2017  Length of Stay: 4 days  Expected Discharge Date:   Attending Physician: Susy Johnson MD  Primary Care Provider:  Ammon Santos MD    SUBJECTIVE:     Principal Problem: Gastroenteritis  Initial history of present illness:Patient is a 57 y.o. female admitted to Hospitalist Service from Dr. Santos's office with complaint of nausea, vomiting and diarrhea for 6 days. Patient reportedly has past medical history significant for GERD, hypertension, hyponatremia (chronic) and MVP. Patient reported 10-20 loose watery bowel movements. Patient stated, her abdomen states distended. No melena, bleeding per rectum, use of NSAIDs or antibiotics reported recently. No sick contact or recent travel history. Patient reports frequent nausea and episode of emesis, on an average twice daily for the past week. No fever or chills. Patient denied chest pain, shortness of breath, abdominal pain, headache, vision changes, focal neuro-deficits, cough or fever.    PMH/PSH/SH/FH/Meds: reviewed.    Symptoms/Review of Systems:  Still having diarrhea, 7-10 loose watery BMs,  less nauseous. No shortness of breath, cough, chest pain or headache, fever or abdominal pain.     Diet:  Adequate intake.    Activity level: Normal.    Pain:  Patient reports no pain.       OBJECTIVE:   Vital Signs (Most Recent):      Temp: 98.5 °F (36.9 °C) (03/02/17 0855)  Pulse: 75 (03/02/17 0855)  Resp: 18 (03/02/17 0855)  BP: (!) 170/90 (03/02/17 0855)  SpO2: 98 % (03/02/17 0855)       Vital Signs Range (Last 24H):  Temp:  [97.6 °F (36.4 °C)-98.6 °F (37 °C)]   Pulse:  [69-77]   Resp:  [17-18]   BP: (134-170)/(73-90)   SpO2:  [94 %-99 %]     Weight: 79.6 kg (175 lb 7.8 oz)  Body mass index is 33.16 kg/(m^2).    Intake/Output Summary (Last 24 hours) at 03/02/17 1032  Last data filed at 03/02/17 2846   Gross per 24 hour   Intake               700 ml   Output                0 ml   Net              700 ml     Physical Examination:  General appearance: well developed, appears stated age  Head: normocephalic, atraumatic  Eyes: conjunctivae/corneas clear. PERRL.  Nose: Nares normal. Septum midline.  Throat: lips, mucosa, and tongue normal; teeth and gums normal, no throat erythema.  Neck: supple, symmetrical, trachea midline, no JVD and thyroid not enlarged, symmetric, no tenderness/mass/nodules  Lungs: clear to auscultation bilaterally and normal respiratory effort  Chest wall: no tenderness  Heart: regular rate and rhythm, S1, S2 normal, no murmur, click, rub or gallop  Abdomen: soft, non-tender non-distented; bowel sounds normal; Enlarged liver and spleen noted - non-tender.  Extremities: no cyanosis, clubbing. 1+ edema.   Pulses: 2+ and symmetric  Skin: Skin color, texture, turgor normal. No rashes or lesions.  Lymph nodes: Cervical, supraclavicular, and axillary nodes normal.  Neurologic: Normal strength and tone. No focal numbness or weakness. CNII-XII intact.       CBC:    Recent Labs  Lab 02/28/17  0603 03/01/17  0536 03/02/17  0522   WBC 7.40 6.60 6.80   RBC 3.98* 3.78* 3.72*   HGB 13.4 12.7 12.5   HCT 40.2 38.1 37.1    169 175   * 101* 100*   MCH 33.6* 33.5* 33.6*   MCHC 33.3 33.3 33.7   BMP    Recent Labs  Lab 02/28/17  0603 03/01/17  0536 03/02/17  0522   GLU 97 98 98    137 135*   K 3.3* 3.7 3.4*    107 106   CO2 21* 22* 21*   BUN <2* <2* <2*   CREATININE 0.6 0.6 0.6   CALCIUM 6.9* 6.8* 7.6*   MG 1.2* 1.4* 1.4*      Diagnostic Results:  Microbiology Results (last 7 days)     Procedure Component Value Units Date/Time    Stool culture [[id number]] Collected:  02/23/17 9077    Order Status:  Completed Specimen:  Stool from Stool Updated:  02/27/17 1455     Stool Culture No Salmonella,Shigella,Vibrio,Campylobacter,Yersinia isolated.    E. coli 0157 antigen [[id number]] Collected:  02/23/17 1478    Order Status:  Completed  Specimen:  Stool from Stool Updated:  02/24/17 1403     Shiga Toxin 1 E.coli Negative     Shiga Toxin 2 E.coli Negative    C Diff Toxin by PCR [[id number]] Collected:  02/23/17 1736    Order Status:  Completed Updated:  02/24/17 1151     C. diff PCR Negative    Clostridium difficile EIA [[id number]]  (Abnormal) Collected:  02/23/17 1736    Order Status:  Completed Specimen:  Stool from Stool Updated:  02/24/17 0206     C. diff Antigen Positive (A)     C difficile Toxins A+B, EIA Negative      Testing not recommended for children <24 months old.            Chest X-Ray: Negative chest.  No significant change    US abdomen:  1.  Mild sludge within the gallbladder which exhibits a mildly thickened wall and mild pericholecystic fluid.  The pericholecystic fluid is nonspecific given the more generalized mild perihepatic free fluid.  Please correlate clinically for findings of acute cholecystitis.  2.  Hepatomegaly with findings suggesting fat infiltration and nodular contour suggesting cirrhosis.  3.  Normal size spleen.    HIDA scan: Incomplete examination secondary to patient claustrophobia, rendering it nondiagnostic for biliary obstruction/cholecystitis.  This patient's charge will be adjusted.    KUB: Nonspecific bowel gas pattern without evidence for dilated loops to suggest bowel obstruction.  A relative paucity of air is present which may represent nondistended or fluid filled loops.    Assessment/Plan:     * Acute Gastroenteritis - possibly non-infectious  Follow Dr. Gonzalez' recommendations.   Obtain KUB. Continue Lactinex and flagyl and Cipro.  Continue IVF hydration.   Use IV anti-emetics as needed.   Continue Questran 4 gram po bid.  Follow abdominal US.     Nausea & vomiting - resolved  Supportive care.  Use anti-emetics as needed.  Continue IVF hydration.    Hypertension  Chronic problem. Will continue chronic medications and monitor for any changes, adjusting as needed.    GERD (gastroesophageal reflux  disease)  Continue PPI.    Mitral valve prolapse  Tele-monitoring.    Hypokalemia/Hypomagnesemia  Replete KCl + Mag.  Follow BMP.    Hyponatremia - Chronic  Secondary to alcoholic liver disease  Follow serum lytes.    Alcoholic liver disease with likely portal hypertension  Alcohol abstinence discussed.     Discussed with patient's  and daughter (RN). Time spent in care of the patient, counseling and coordination of care (Greater than 50% spent in direct face to face contact): 35 min.    VTE Risk Mitigation         Ordered     enoxaparin injection 40 mg  Daily     Route:  Subcutaneous        02/23/17 1637     Medium Risk of VTE  Once      02/23/17 1637        Susy Johnson MD  Department of Hospital Medicine   [facility]

## 2017-03-03 ENCOUNTER — ANESTHESIA EVENT (OUTPATIENT)
Dept: ENDOSCOPY | Facility: HOSPITAL | Age: 57
DRG: 392 | End: 2017-03-03
Payer: COMMERCIAL

## 2017-03-03 ENCOUNTER — ANESTHESIA (OUTPATIENT)
Dept: ENDOSCOPY | Facility: HOSPITAL | Age: 57
DRG: 392 | End: 2017-03-03
Payer: COMMERCIAL

## 2017-03-03 VITALS — RESPIRATION RATE: 34 BRPM

## 2017-03-03 LAB
ALBUMIN SERPL BCP-MCNC: 2.5 G/DL
ALP SERPL-CCNC: 124 U/L
ALT SERPL W/O P-5'-P-CCNC: 11 U/L
ANION GAP SERPL CALC-SCNC: 9 MMOL/L
APTT BLDCRRT: 31 SEC
AST SERPL-CCNC: 46 U/L
BASOPHILS # BLD AUTO: 0 K/UL
BASOPHILS NFR BLD: 0.3 %
BILIRUB SERPL-MCNC: 1.1 MG/DL
BUN SERPL-MCNC: <2 MG/DL
C DIFF GDH STL QL: NEGATIVE
C DIFF TOX A+B STL QL IA: NEGATIVE
CALCIUM SERPL-MCNC: 8.1 MG/DL
CHLORIDE SERPL-SCNC: 106 MMOL/L
CO2 SERPL-SCNC: 22 MMOL/L
CREAT SERPL-MCNC: 0.5 MG/DL
CRP SERPL-MCNC: 22.9 MG/L
DIFFERENTIAL METHOD: ABNORMAL
EOSINOPHIL # BLD AUTO: 0.1 K/UL
EOSINOPHIL NFR BLD: 0.7 %
ERYTHROCYTE [DISTWIDTH] IN BLOOD BY AUTOMATED COUNT: 15.2 %
ERYTHROCYTE [SEDIMENTATION RATE] IN BLOOD BY WESTERGREN METHOD: 42 MM/HR
EST. GFR  (AFRICAN AMERICAN): >60 ML/MIN/1.73 M^2
EST. GFR  (NON AFRICAN AMERICAN): >60 ML/MIN/1.73 M^2
GLUCOSE SERPL-MCNC: 96 MG/DL
HCT VFR BLD AUTO: 39.3 %
HGB BLD-MCNC: 13.1 G/DL
INR PPP: 1.1
LYMPHOCYTES # BLD AUTO: 1.5 K/UL
LYMPHOCYTES NFR BLD: 21.6 %
MAGNESIUM SERPL-MCNC: 1.5 MG/DL
MCH RBC QN AUTO: 33.2 PG
MCHC RBC AUTO-ENTMCNC: 33.2 %
MCV RBC AUTO: 100 FL
MONOCYTES # BLD AUTO: 0.9 K/UL
MONOCYTES NFR BLD: 12.5 %
NEUTROPHILS # BLD AUTO: 4.4 K/UL
NEUTROPHILS NFR BLD: 64.9 %
OB PNL STL: NEGATIVE
PHOSPHATE SERPL-MCNC: 3.7 MG/DL
PLATELET # BLD AUTO: 206 K/UL
PMV BLD AUTO: 8.6 FL
POTASSIUM SERPL-SCNC: 3.6 MMOL/L
PROT SERPL-MCNC: 5.9 G/DL
PROTHROMBIN TIME: 12 SEC
RBC # BLD AUTO: 3.94 M/UL
RV AG STL QL IA.RAPID: NEGATIVE
SODIUM SERPL-SCNC: 137 MMOL/L
WBC # BLD AUTO: 6.8 K/UL
WBC #/AREA STL HPF: NORMAL /[HPF]

## 2017-03-03 PROCEDURE — 25000003 PHARM REV CODE 250: Performed by: INTERNAL MEDICINE

## 2017-03-03 PROCEDURE — 25500020 PHARM REV CODE 255: Performed by: INTERNAL MEDICINE

## 2017-03-03 PROCEDURE — 45331 SIGMOIDOSCOPY AND BIOPSY: CPT | Performed by: INTERNAL MEDICINE

## 2017-03-03 PROCEDURE — 80053 COMPREHEN METABOLIC PANEL: CPT

## 2017-03-03 PROCEDURE — 87081 CULTURE SCREEN ONLY: CPT

## 2017-03-03 PROCEDURE — 86140 C-REACTIVE PROTEIN: CPT

## 2017-03-03 PROCEDURE — 63600175 PHARM REV CODE 636 W HCPCS: Performed by: EMERGENCY MEDICINE

## 2017-03-03 PROCEDURE — D9220A PRA ANESTHESIA: Mod: CRNA,,, | Performed by: NURSE ANESTHETIST, CERTIFIED REGISTERED

## 2017-03-03 PROCEDURE — 63600175 PHARM REV CODE 636 W HCPCS: Performed by: INTERNAL MEDICINE

## 2017-03-03 PROCEDURE — 25000003 PHARM REV CODE 250: Performed by: EMERGENCY MEDICINE

## 2017-03-03 PROCEDURE — 84100 ASSAY OF PHOSPHORUS: CPT

## 2017-03-03 PROCEDURE — 82272 OCCULT BLD FECES 1-3 TESTS: CPT

## 2017-03-03 PROCEDURE — 87449 NOS EACH ORGANISM AG IA: CPT

## 2017-03-03 PROCEDURE — 43239 EGD BIOPSY SINGLE/MULTIPLE: CPT | Performed by: INTERNAL MEDICINE

## 2017-03-03 PROCEDURE — 0DB68ZX EXCISION OF STOMACH, VIA NATURAL OR ARTIFICIAL OPENING ENDOSCOPIC, DIAGNOSTIC: ICD-10-PCS | Performed by: INTERNAL MEDICINE

## 2017-03-03 PROCEDURE — 0DBN8ZX EXCISION OF SIGMOID COLON, VIA NATURAL OR ARTIFICIAL OPENING ENDOSCOPIC, DIAGNOSTIC: ICD-10-PCS | Performed by: INTERNAL MEDICINE

## 2017-03-03 PROCEDURE — 85025 COMPLETE CBC W/AUTO DIFF WBC: CPT

## 2017-03-03 PROCEDURE — 87209 SMEAR COMPLEX STAIN: CPT

## 2017-03-03 PROCEDURE — 25500020 PHARM REV CODE 255

## 2017-03-03 PROCEDURE — 85651 RBC SED RATE NONAUTOMATED: CPT

## 2017-03-03 PROCEDURE — 87329 GIARDIA AG IA: CPT

## 2017-03-03 PROCEDURE — 36415 COLL VENOUS BLD VENIPUNCTURE: CPT

## 2017-03-03 PROCEDURE — 37000009 HC ANESTHESIA EA ADD 15 MINS: Performed by: INTERNAL MEDICINE

## 2017-03-03 PROCEDURE — 89055 LEUKOCYTE ASSESSMENT FECAL: CPT

## 2017-03-03 PROCEDURE — 12000002 HC ACUTE/MED SURGE SEMI-PRIVATE ROOM

## 2017-03-03 PROCEDURE — 25000003 PHARM REV CODE 250: Performed by: NURSE PRACTITIONER

## 2017-03-03 PROCEDURE — 87177 OVA AND PARASITES SMEARS: CPT

## 2017-03-03 PROCEDURE — 99233 SBSQ HOSP IP/OBS HIGH 50: CPT | Mod: ,,, | Performed by: INTERNAL MEDICINE

## 2017-03-03 PROCEDURE — 88305 TISSUE EXAM BY PATHOLOGIST: CPT | Performed by: PATHOLOGY

## 2017-03-03 PROCEDURE — 0DB98ZX EXCISION OF DUODENUM, VIA NATURAL OR ARTIFICIAL OPENING ENDOSCOPIC, DIAGNOSTIC: ICD-10-PCS | Performed by: INTERNAL MEDICINE

## 2017-03-03 PROCEDURE — 85610 PROTHROMBIN TIME: CPT

## 2017-03-03 PROCEDURE — 37000008 HC ANESTHESIA 1ST 15 MINUTES: Performed by: INTERNAL MEDICINE

## 2017-03-03 PROCEDURE — D9220A PRA ANESTHESIA: Mod: ANES,,, | Performed by: ANESTHESIOLOGY

## 2017-03-03 PROCEDURE — 83735 ASSAY OF MAGNESIUM: CPT

## 2017-03-03 PROCEDURE — 63600175 PHARM REV CODE 636 W HCPCS: Performed by: NURSE ANESTHETIST, CERTIFIED REGISTERED

## 2017-03-03 PROCEDURE — 85730 THROMBOPLASTIN TIME PARTIAL: CPT

## 2017-03-03 PROCEDURE — 27201012 HC FORCEPS, HOT/COLD, DISP: Performed by: INTERNAL MEDICINE

## 2017-03-03 PROCEDURE — 87425 ROTAVIRUS AG IA: CPT

## 2017-03-03 RX ORDER — DEXTROMETHORPHAN/PSEUDOEPHED 2.5-7.5/.8
DROPS ORAL
Status: DISCONTINUED
Start: 2017-03-03 | End: 2017-03-05 | Stop reason: HOSPADM

## 2017-03-03 RX ORDER — SODIUM CHLORIDE 9 MG/ML
INJECTION, SOLUTION INTRAVENOUS CONTINUOUS
Status: DISCONTINUED | OUTPATIENT
Start: 2017-03-03 | End: 2017-03-03

## 2017-03-03 RX ORDER — PROPOFOL 10 MG/ML
VIAL (ML) INTRAVENOUS
Status: DISCONTINUED | OUTPATIENT
Start: 2017-03-03 | End: 2017-03-03

## 2017-03-03 RX ORDER — DIPHENOXYLATE HYDROCHLORIDE AND ATROPINE SULFATE 2.5; .025 MG/1; MG/1
1 TABLET ORAL EVERY 6 HOURS PRN
Status: DISCONTINUED | OUTPATIENT
Start: 2017-03-03 | End: 2017-03-05 | Stop reason: HOSPADM

## 2017-03-03 RX ADMIN — IRBESARTAN 150 MG: 150 TABLET ORAL at 02:03

## 2017-03-03 RX ADMIN — ATENOLOL 100 MG: 50 TABLET ORAL at 09:03

## 2017-03-03 RX ADMIN — METRONIDAZOLE 500 MG: 500 INJECTION, SOLUTION INTRAVENOUS at 07:03

## 2017-03-03 RX ADMIN — PROPOFOL 40 MG: 10 INJECTION, EMULSION INTRAVENOUS at 10:03

## 2017-03-03 RX ADMIN — IOHEXOL 100 ML: 350 INJECTION, SOLUTION INTRAVENOUS at 03:03

## 2017-03-03 RX ADMIN — ENOXAPARIN SODIUM 40 MG: 100 INJECTION SUBCUTANEOUS at 12:03

## 2017-03-03 RX ADMIN — CIPROFLOXACIN 400 MG: 2 INJECTION, SOLUTION INTRAVENOUS at 09:03

## 2017-03-03 RX ADMIN — CHOLESTYRAMINE 4 G: 4 POWDER, FOR SUSPENSION ORAL at 07:03

## 2017-03-03 RX ADMIN — DIPHENOXYLATE HYDROCHLORIDE AND ATROPINE SULFATE 1 TABLET: 2.5; .025 TABLET ORAL at 10:03

## 2017-03-03 RX ADMIN — THIAMINE HCL TAB 100 MG 100 MG: 100 TAB at 09:03

## 2017-03-03 RX ADMIN — HYDROCODONE BITARTRATE AND ACETAMINOPHEN 1 TABLET: 5; 325 TABLET ORAL at 07:03

## 2017-03-03 RX ADMIN — DIPHENOXYLATE HYDROCHLORIDE AND ATROPINE SULFATE 1 TABLET: 2.5; .025 TABLET ORAL at 05:03

## 2017-03-03 RX ADMIN — PROPOFOL 100 MG: 10 INJECTION, EMULSION INTRAVENOUS at 09:03

## 2017-03-03 RX ADMIN — METRONIDAZOLE 500 MG: 500 INJECTION, SOLUTION INTRAVENOUS at 03:03

## 2017-03-03 RX ADMIN — SODIUM CHLORIDE 1000 ML: 0.9 INJECTION, SOLUTION INTRAVENOUS at 08:03

## 2017-03-03 RX ADMIN — ALPRAZOLAM 0.5 MG: 0.25 TABLET ORAL at 11:03

## 2017-03-03 RX ADMIN — PROPOFOL 40 MG: 10 INJECTION, EMULSION INTRAVENOUS at 09:03

## 2017-03-03 RX ADMIN — METRONIDAZOLE 500 MG: 500 INJECTION, SOLUTION INTRAVENOUS at 12:03

## 2017-03-03 RX ADMIN — NICOTINE 1 PATCH: 14 PATCH, EXTENDED RELEASE TRANSDERMAL at 02:03

## 2017-03-03 RX ADMIN — TRAZODONE HYDROCHLORIDE 50 MG: 50 TABLET ORAL at 07:03

## 2017-03-03 RX ADMIN — LORAZEPAM 0.5 MG: 0.5 TABLET ORAL at 09:03

## 2017-03-03 RX ADMIN — IOHEXOL 30 ML: 350 INJECTION, SOLUTION INTRAVENOUS at 03:03

## 2017-03-03 RX ADMIN — FOLIC ACID 1 MG: 1 TABLET ORAL at 07:03

## 2017-03-03 RX ADMIN — ALPRAZOLAM 0.5 MG: 0.25 TABLET ORAL at 10:03

## 2017-03-03 RX ADMIN — MAGNESIUM SULFATE HEPTAHYDRATE 3 G: 500 INJECTION, SOLUTION INTRAMUSCULAR; INTRAVENOUS at 01:03

## 2017-03-03 RX ADMIN — LACTOBACILLUS ACIDOPHILUS / LACTOBACILLUS BULGARICUS 1 EACH: 100 MILLION CFU STRENGTH GRANULES at 09:03

## 2017-03-03 NOTE — PLAN OF CARE
Problem: Patient Care Overview  Goal: Plan of Care Review  Outcome: Ongoing (interventions implemented as appropriate)  Pt awake in bed. AAOx3. Dx: n/v/d. Plan of care reviwed. Questions answered. Verbalized understanding. Pt scheduled to have EGD done today. NPO. Up ad edwige. Daily wt. Telemetry in place. Conts IV antibiotics. No further complaints. Call light in reach. Bed low. Will cont to monitor.

## 2017-03-03 NOTE — PROGRESS NOTES
Progress Note  Hospital Medicine  Patient Name:Sophia Edward  MRN:  [id number]  Patient Class: IP- Inpatient  Admit Date: 2/23/2017  Length of Stay: 5 days  Expected Discharge Date:   Attending Physician: Susy Johnson MD  Primary Care Provider:  Ammon Santos MD    SUBJECTIVE:     Principal Problem: Gastroenteritis  Initial history of present illness:Patient is a 57 y.o. female admitted to Hospitalist Service from Dr. Santos's office with complaint of nausea, vomiting and diarrhea for 6 days. Patient reportedly has past medical history significant for GERD, hypertension, hyponatremia (chronic) and MVP. Patient reported 10-20 loose watery bowel movements. Patient stated, her abdomen states distended. No melena, bleeding per rectum, use of NSAIDs or antibiotics reported recently. No sick contact or recent travel history. Patient reports frequent nausea and episode of emesis, on an average twice daily for the past week. No fever or chills. Patient denied chest pain, shortness of breath, abdominal pain, headache, vision changes, focal neuro-deficits, cough or fever.    PMH/PSH/SH/FH/Meds: reviewed.    Symptoms/Review of Systems:  S/p EGD. Still having diarrhea, 7-10 loose watery BMs,  less nauseous. No shortness of breath, cough, chest pain or headache, fever or abdominal pain.     Diet:  Adequate intake.    Activity level: Normal.    Pain:  Patient reports no pain.       OBJECTIVE:   Vital Signs (Most Recent):      Temp: 97.3 °F (36.3 °C) (03/03/17 0833)  Pulse: 70 (03/03/17 0833)  Resp: 16 (03/03/17 0833)  BP: (!) 171/79 (03/03/17 0833)  SpO2: 95 % (03/03/17 0833)       Vital Signs Range (Last 24H):  Temp:  [97.3 °F (36.3 °C)-98.5 °F (36.9 °C)]   Pulse:  [68-79]   Resp:  [16-18]   BP: (143-171)/(74-91)   SpO2:  [93 %-95 %]     Weight: 79.4 kg (175 lb)  Body mass index is 33.07 kg/(m^2).    Intake/Output Summary (Last 24 hours) at 03/03/17 0927  Last data filed at 03/02/17 1815   Gross per 24 hour   Intake               780 ml   Output                0 ml   Net              780 ml     Physical Examination:  General appearance: well developed, appears stated age  Head: normocephalic, atraumatic  Eyes: conjunctivae/corneas clear. PERRL.  Nose: Nares normal. Septum midline.  Throat: lips, mucosa, and tongue normal; teeth and gums normal, no throat erythema.  Neck: supple, symmetrical, trachea midline, no JVD and thyroid not enlarged, symmetric, no tenderness/mass/nodules  Lungs: clear to auscultation bilaterally and normal respiratory effort  Chest wall: no tenderness  Heart: regular rate and rhythm, S1, S2 normal, no murmur, click, rub or gallop  Abdomen: soft, non-tender non-distented; bowel sounds normal; Enlarged liver and spleen noted - non-tender.  Extremities: no cyanosis, clubbing. 1+ edema.   Pulses: 2+ and symmetric  Skin: Skin color, texture, turgor normal. No rashes or lesions.  Lymph nodes: Cervical, supraclavicular, and axillary nodes normal.  Neurologic: Normal strength and tone. No focal numbness or weakness. CNII-XII intact.       CBC:    Recent Labs  Lab 03/01/17  0536 03/02/17  0522 03/03/17  0506   WBC 6.60 6.80 6.80   RBC 3.78* 3.72* 3.94*   HGB 12.7 12.5 13.1   HCT 38.1 37.1 39.3    175 206   * 100* 100*   MCH 33.5* 33.6* 33.2*   MCHC 33.3 33.7 33.2   BMP    Recent Labs  Lab 03/01/17  0536 03/02/17  0522 03/03/17  0506   GLU 98 98 96    135* 137   K 3.7 3.4* 3.6    106 106   CO2 22* 21* 22*   BUN <2* <2* <2*   CREATININE 0.6 0.6 0.5   CALCIUM 6.8* 7.6* 8.1*   MG 1.4* 1.4* 1.5*      Diagnostic Results:  Microbiology Results (last 7 days)     Procedure Component Value Units Date/Time    Clostridium difficile EIA [[id number]] Collected:  03/03/17 0755    Order Status:  Sent Specimen:  Stool from Stool Updated:  03/03/17 0836    Clostridium difficile EIA [[id number]]     Order Status:  No result Specimen:  Stool from Stool     Stool culture [[id number]] Collected:  02/23/17 1736    Order  Status:  Completed Specimen:  Stool from Stool Updated:  02/27/17 1455     Stool Culture No Salmonella,Shigella,Vibrio,Campylobacter,Yersinia isolated.    E. coli 0157 antigen [[id number]] Collected:  02/23/17 1736    Order Status:  Completed Specimen:  Stool from Stool Updated:  02/24/17 1403     Shiga Toxin 1 E.coli Negative     Shiga Toxin 2 E.coli Negative    C Diff Toxin by PCR [[id number]] Collected:  02/23/17 1736    Order Status:  Completed Updated:  02/24/17 1151     C. diff PCR Negative         Chest X-Ray: Negative chest.  No significant change    US abdomen:  1.  Mild sludge within the gallbladder which exhibits a mildly thickened wall and mild pericholecystic fluid.  The pericholecystic fluid is nonspecific given the more generalized mild perihepatic free fluid.  Please correlate clinically for findings of acute cholecystitis.  2.  Hepatomegaly with findings suggesting fat infiltration and nodular contour suggesting cirrhosis.  3.  Normal size spleen.    HIDA scan: Incomplete examination secondary to patient claustrophobia, rendering it nondiagnostic for biliary obstruction/cholecystitis.  This patient's charge will be adjusted.    KUB: Nonspecific bowel gas pattern without evidence for dilated loops to suggest bowel obstruction.  A relative paucity of air is present which may represent nondistended or fluid filled loops.    RUQ US: 1.  Mild gallbladder sludge without features of acute cholecystitis.  2.  Features suggesting fatty liver and findings raising concern for cirrhosis.  3.  Small volume ascites.    Assessment/Plan:     * Acute Gastroenteritis - possibly non-infectious  Follow Dr. Gonzalez' recommendations.   Obtain KUB. Continue Lactinex and flagyl and Cipro.  Continue IVF hydration.   Use IV anti-emetics as needed.   Continue Questran 4 gram po bid.  Follow abdominal US.     Nausea & vomiting - resolved  Supportive care.  Use anti-emetics as needed.  Continue IVF  hydration.    Hypertension  Chronic problem. Will continue chronic medications and monitor for any changes, adjusting as needed.    GERD (gastroesophageal reflux disease)  Continue PPI.    Mitral valve prolapse  Tele-monitoring.    Hypokalemia/Hypomagnesemia  Replete KCl + Mag.  Follow BMP.    Hyponatremia - Chronic  Secondary to alcoholic liver disease  Follow serum lytes.    Alcoholic liver disease with likely portal hypertension  Alcohol abstinence discussed.     Discussed with patient's  and daughter (RN). Time spent in care of the patient, counseling and coordination of care (Greater than 50% spent in direct face to face contact): 35 min.    VTE Risk Mitigation         Ordered     enoxaparin injection 40 mg  Daily     Route:  Subcutaneous        02/23/17 1637     Medium Risk of VTE  Once      02/23/17 1637        Susy Johnson MD  Department of Hospital Medicine   [facility]

## 2017-03-03 NOTE — PROGRESS NOTES
The patient continues to have diarrhea.  The patient's ultrasound was noted.    The patient has hepatosplenomegaly.  The patient will undergo an upper endoscopy   tomorrow morning and see if there is any mucosal abnormality or any other   abnormality causing this persistent diarrhea.  Detailed examination revealed no   abnormalities.  Stool test will be repeated again and we will proceed from   there.    Once again, I am grateful to Dr. Johnson for this referral.      MICHELE/  dd: 03/02/2017 22:48:53 (CST)  td: 03/03/2017 01:13:20 (CST)  Doc ID   #[id number]  Job ID #[id number]    CC:

## 2017-03-03 NOTE — ANESTHESIA PREPROCEDURE EVALUATION
03/03/2017  Sophia Edward is a 57 y.o., female.    [facility] Anesthesia Evaluation    I have reviewed the Patient Summary Reports.    I have reviewed the Nursing Notes.      Review of Systems  Anesthesia Hx:  No problems with previous Anesthesia    Social:  Smoker    Hematology/Oncology:  Hematology Normal   Oncology Normal     EENT/Dental:EENT/Dental Normal   Cardiovascular:   Hypertension, well controlled Valvular problems/Murmurs, MVP    Pulmonary:  Pulmonary Normal    Renal/:   Chronic Renal Disease    Hepatic/GI:   GERD    Musculoskeletal:  Musculoskeletal Normal    Neurological:  Neurology Normal    Endocrine:  Endocrine Normal    Dermatological:  Skin Normal    Psych:  Psychiatric Normal           Physical Exam  General:  Obesity    Airway/Jaw/Neck:  AIRWAY FINDINGS: Normal      Eyes/Ears/Nose:  EYES/EARS/NOSE FINDINGS: Normal   Dental:  DENTAL FINDINGS: Normal   Chest/Lungs:  Chest/Lungs Findings: Clear to auscultation     Heart/Vascular:  Heart Findings: Rate: Normal  Rhythm: Regular Rhythm  Sounds: Normal  Heart murmur: negative Vascular Findings: Normal    Abdomen:  Abdomen Findings: Normal    Musculoskeletal:  Musculoskeletal Findings: Normal   Skin:  Skin Findings: Normal    Mental Status:  Mental Status Findings: Normal        Anesthesia Plan  Type of Anesthesia, risks & benefits discussed:  Anesthesia Type:  general  Patient's Preference:   Intra-op Monitoring Plan:   Intra-op Monitoring Plan Comments:   Post Op Pain Control Plan:   Post Op Pain Control Plan Comments:   Induction:    Beta Blocker:  Patient is on a Beta-Blocker and has received one dose within the past 24 hours (No further documentation required).       Informed Consent: Patient understands risks and agrees with Anesthesia plan.  Questions answered. Anesthesia consent signed with patient.  ASA Score: 2     Day of Surgery Review of  History & Physical:    H&P update referred to the provider.         Ready For Surgery From Anesthesia Perspective.

## 2017-03-03 NOTE — TRANSFER OF CARE
"Anesthesia Transfer of Care Note    Patient: Sophia Edward    Procedure(s) Performed: Procedure(s) (LRB):  ESOPHAGOGASTRODUODENOSCOPY (EGD) (N/A)  SIGMOIDOSCOPY-FLEXIBLE (N/A)    Patient location: GI    Anesthesia Type: general    Transport from OR: Transported from OR on 2-3 L/min O2 by NC with adequate spontaneous ventilation    Post pain: adequate analgesia    Post assessment: no apparent anesthetic complications and tolerated procedure well    Post vital signs: stable    Level of consciousness: awake and alert    Nausea/Vomiting: no nausea/vomiting    Complications: none          Last vitals:   Visit Vitals    BP (!) 171/79 (Patient Position: Lying, BP Method: Automatic)    Pulse 70    Temp 36.3 °C (97.3 °F) (Oral)    Resp 16    Ht 5' 1" (1.549 m)    Wt 79.4 kg (175 lb)    SpO2 95%    Breastfeeding No    BMI 33.07 kg/m2     "

## 2017-03-03 NOTE — PROGRESS NOTES
"1:00 PM  Orders for CT chest and CT abdomen noted from transfer from EGD - cresencio Pena.  Family and patient requesting approximate time of procedure.   Orders for CT abdomen and CT chest ordered "Future," and unable to perform inpatient. CT states unable to view order due to status. Orders for CT abdomen and CT chest re ordered under Dr Gonzalez to be done inpatient today.    1:06 PM  Devon in CT reports patient may receive PO contrast w/i approx 1 hr and have CT done in 1.5 hrs.  "

## 2017-03-03 NOTE — ANESTHESIA POSTPROCEDURE EVALUATION
"Anesthesia Post Evaluation    Patient: Sophia Edward    Procedure(s) Performed: Procedure(s) (LRB):  ESOPHAGOGASTRODUODENOSCOPY (EGD) (N/A)  SIGMOIDOSCOPY-FLEXIBLE (N/A)    Final Anesthesia Type: general  Patient location during evaluation: PACU  Patient participation: Yes- Able to Participate  Level of consciousness: awake and alert and oriented  Post-procedure vital signs: reviewed and stable  Pain management: adequate  Airway patency: patent  PONV status at discharge: No PONV  Anesthetic complications: no      Cardiovascular status: blood pressure returned to baseline  Respiratory status: unassisted, spontaneous ventilation and room air  Hydration status: euvolemic  Follow-up not needed.        Visit Vitals    BP (!) 161/86    Pulse 75    Temp 36.3 °C (97.3 °F) (Oral)    Resp 16    Ht 5' 1" (1.549 m)    Wt 79.4 kg (175 lb)    SpO2 95%    Breastfeeding No    BMI 33.07 kg/m2       Pain/Viet Score: Pain Assessment Performed: Yes (3/3/2017 10:42 AM)  Presence of Pain: denies (3/3/2017 10:50 AM)  Pain Rating Prior to Med Admin: 6 (3/2/2017  3:35 PM)  Pain Rating Post Med Admin: 3 (3/2/2017  4:19 PM)  Viet Score: 10 (3/3/2017 10:50 AM)      "

## 2017-03-03 NOTE — PLAN OF CARE
Pt awake, alert.  Vital signs stable, npo for CT scan, denies pain, abd soft, No adverse effects of anesthesia noted.  Report called to [email or web address]

## 2017-03-03 NOTE — PLAN OF CARE
Problem: Patient Care Overview  Goal: Plan of Care Review  Outcome: Ongoing (interventions implemented as appropriate)  Pt remained free of injury throughout shift, able to call for assistance when needed, egd done today, magnesium replaced, ct of chest and abdomen/pelvis done, no complaints of pain, multiple loose stools reported, vital signs stable, will continue to monitor pt.

## 2017-03-04 PROBLEM — R19.7 INTRACTABLE DIARRHEA: Status: ACTIVE | Noted: 2017-03-04

## 2017-03-04 PROBLEM — K52.9 GASTROENTERITIS: Status: RESOLVED | Noted: 2017-02-23 | Resolved: 2017-03-04

## 2017-03-04 PROBLEM — K70.30 ALCOHOLIC CIRRHOSIS OF LIVER WITHOUT ASCITES: Status: ACTIVE | Noted: 2017-03-04

## 2017-03-04 PROBLEM — R11.2 NAUSEA AND VOMITING: Status: RESOLVED | Noted: 2017-02-23 | Resolved: 2017-03-04

## 2017-03-04 PROBLEM — F10.10 ETOH ABUSE: Status: ACTIVE | Noted: 2017-03-04

## 2017-03-04 PROBLEM — E83.42 HYPOMAGNESEMIA: Status: ACTIVE | Noted: 2017-03-04

## 2017-03-04 LAB
ALBUMIN SERPL BCP-MCNC: 2.4 G/DL
ALP SERPL-CCNC: 116 U/L
ALT SERPL W/O P-5'-P-CCNC: 9 U/L
ANION GAP SERPL CALC-SCNC: 10 MMOL/L
ANION GAP SERPL CALC-SCNC: 7 MMOL/L
AST SERPL-CCNC: 44 U/L
BASOPHILS # BLD AUTO: 0.1 K/UL
BASOPHILS NFR BLD: 1.1 %
BILIRUB SERPL-MCNC: 1.1 MG/DL
BUN SERPL-MCNC: 2 MG/DL
BUN SERPL-MCNC: <2 MG/DL
CALCIUM SERPL-MCNC: 8.3 MG/DL
CALCIUM SERPL-MCNC: 8.8 MG/DL
CHLORIDE SERPL-SCNC: 101 MMOL/L
CHLORIDE SERPL-SCNC: 105 MMOL/L
CO2 SERPL-SCNC: 24 MMOL/L
CO2 SERPL-SCNC: 26 MMOL/L
CREAT SERPL-MCNC: 0.6 MG/DL
CREAT SERPL-MCNC: 0.6 MG/DL
CRYPTOSP AG STL QL IA: NEGATIVE
DIFFERENTIAL METHOD: ABNORMAL
EOSINOPHIL # BLD AUTO: 0.1 K/UL
EOSINOPHIL NFR BLD: 1.3 %
ERYTHROCYTE [DISTWIDTH] IN BLOOD BY AUTOMATED COUNT: 14.8 %
EST. GFR  (AFRICAN AMERICAN): >60 ML/MIN/1.73 M^2
EST. GFR  (AFRICAN AMERICAN): >60 ML/MIN/1.73 M^2
EST. GFR  (NON AFRICAN AMERICAN): >60 ML/MIN/1.73 M^2
EST. GFR  (NON AFRICAN AMERICAN): >60 ML/MIN/1.73 M^2
G LAMBLIA AG STL QL IA: NEGATIVE
GLUCOSE SERPL-MCNC: 112 MG/DL
GLUCOSE SERPL-MCNC: 84 MG/DL
HCT VFR BLD AUTO: 38.5 %
HGB BLD-MCNC: 12.9 G/DL
LYMPHOCYTES # BLD AUTO: 1.4 K/UL
LYMPHOCYTES NFR BLD: 22.7 %
MAGNESIUM SERPL-MCNC: 1.2 MG/DL
MAGNESIUM SERPL-MCNC: 1.8 MG/DL
MCH RBC QN AUTO: 33.2 PG
MCHC RBC AUTO-ENTMCNC: 33.4 %
MCV RBC AUTO: 100 FL
MONOCYTES # BLD AUTO: 0.9 K/UL
MONOCYTES NFR BLD: 13.5 %
NEUTROPHILS # BLD AUTO: 3.9 K/UL
NEUTROPHILS NFR BLD: 61.4 %
PHOSPHATE SERPL-MCNC: 4.3 MG/DL
PLATELET # BLD AUTO: 197 K/UL
PMV BLD AUTO: 8 FL
POTASSIUM SERPL-SCNC: 3.2 MMOL/L
POTASSIUM SERPL-SCNC: 3.3 MMOL/L
PROT SERPL-MCNC: 5.6 G/DL
RBC # BLD AUTO: 3.86 M/UL
SODIUM SERPL-SCNC: 135 MMOL/L
SODIUM SERPL-SCNC: 138 MMOL/L
UREASE 1D P INC TISS QL: NEGATIVE
WBC # BLD AUTO: 6.3 K/UL

## 2017-03-04 PROCEDURE — 85025 COMPLETE CBC W/AUTO DIFF WBC: CPT

## 2017-03-04 PROCEDURE — 84100 ASSAY OF PHOSPHORUS: CPT

## 2017-03-04 PROCEDURE — 83735 ASSAY OF MAGNESIUM: CPT

## 2017-03-04 PROCEDURE — 63600175 PHARM REV CODE 636 W HCPCS: Performed by: INTERNAL MEDICINE

## 2017-03-04 PROCEDURE — 25000003 PHARM REV CODE 250: Performed by: EMERGENCY MEDICINE

## 2017-03-04 PROCEDURE — 63600175 PHARM REV CODE 636 W HCPCS: Performed by: EMERGENCY MEDICINE

## 2017-03-04 PROCEDURE — 36415 COLL VENOUS BLD VENIPUNCTURE: CPT

## 2017-03-04 PROCEDURE — 25000003 PHARM REV CODE 250: Performed by: INTERNAL MEDICINE

## 2017-03-04 PROCEDURE — 80048 BASIC METABOLIC PNL TOTAL CA: CPT

## 2017-03-04 PROCEDURE — 63600175 PHARM REV CODE 636 W HCPCS: Performed by: HOSPITALIST

## 2017-03-04 PROCEDURE — 80053 COMPREHEN METABOLIC PANEL: CPT

## 2017-03-04 PROCEDURE — 12000002 HC ACUTE/MED SURGE SEMI-PRIVATE ROOM

## 2017-03-04 PROCEDURE — 86316 IMMUNOASSAY TUMOR OTHER: CPT

## 2017-03-04 PROCEDURE — 25000003 PHARM REV CODE 250: Performed by: HOSPITALIST

## 2017-03-04 PROCEDURE — 83735 ASSAY OF MAGNESIUM: CPT | Mod: 91

## 2017-03-04 PROCEDURE — 25000003 PHARM REV CODE 250: Performed by: NURSE PRACTITIONER

## 2017-03-04 RX ORDER — SPIRONOLACTONE 25 MG/1
25 TABLET ORAL DAILY
Status: DISCONTINUED | OUTPATIENT
Start: 2017-03-05 | End: 2017-03-05 | Stop reason: HOSPADM

## 2017-03-04 RX ORDER — POTASSIUM CHLORIDE 20 MEQ/1
40 TABLET, EXTENDED RELEASE ORAL DAILY PRN
Status: DISCONTINUED | OUTPATIENT
Start: 2017-03-04 | End: 2017-03-05 | Stop reason: HOSPADM

## 2017-03-04 RX ADMIN — METRONIDAZOLE 500 MG: 500 INJECTION, SOLUTION INTRAVENOUS at 08:03

## 2017-03-04 RX ADMIN — POTASSIUM CHLORIDE 40 MEQ: 20 TABLET, EXTENDED RELEASE ORAL at 05:03

## 2017-03-04 RX ADMIN — TRAZODONE HYDROCHLORIDE 50 MG: 50 TABLET ORAL at 08:03

## 2017-03-04 RX ADMIN — HYDROCODONE BITARTRATE AND ACETAMINOPHEN 1 TABLET: 5; 325 TABLET ORAL at 05:03

## 2017-03-04 RX ADMIN — IRBESARTAN 150 MG: 150 TABLET ORAL at 08:03

## 2017-03-04 RX ADMIN — HYDROCODONE BITARTRATE AND ACETAMINOPHEN 1 TABLET: 5; 325 TABLET ORAL at 01:03

## 2017-03-04 RX ADMIN — FOLIC ACID 1 MG: 1 TABLET ORAL at 08:03

## 2017-03-04 RX ADMIN — CHOLESTYRAMINE 4 G: 4 POWDER, FOR SUSPENSION ORAL at 08:03

## 2017-03-04 RX ADMIN — PANTOPRAZOLE SODIUM 40 MG: 40 TABLET, DELAYED RELEASE ORAL at 08:03

## 2017-03-04 RX ADMIN — MAGNESIUM SULFATE HEPTAHYDRATE 3 G: 500 INJECTION, SOLUTION INTRAMUSCULAR; INTRAVENOUS at 05:03

## 2017-03-04 RX ADMIN — LORAZEPAM 0.5 MG: 0.5 TABLET ORAL at 08:03

## 2017-03-04 RX ADMIN — ATENOLOL 100 MG: 50 TABLET ORAL at 08:03

## 2017-03-04 RX ADMIN — THIAMINE HCL TAB 100 MG 100 MG: 100 TAB at 08:03

## 2017-03-04 RX ADMIN — DIPHENOXYLATE HYDROCHLORIDE AND ATROPINE SULFATE 1 TABLET: 2.5; .025 TABLET ORAL at 03:03

## 2017-03-04 RX ADMIN — HYDROCODONE BITARTRATE AND ACETAMINOPHEN 1 TABLET: 5; 325 TABLET ORAL at 10:03

## 2017-03-04 RX ADMIN — LACTOBACILLUS ACIDOPHILUS / LACTOBACILLUS BULGARICUS 1 EACH: 100 MILLION CFU STRENGTH GRANULES at 08:03

## 2017-03-04 RX ADMIN — DIPHENOXYLATE HYDROCHLORIDE AND ATROPINE SULFATE 1 TABLET: 2.5; .025 TABLET ORAL at 08:03

## 2017-03-04 RX ADMIN — ENOXAPARIN SODIUM 40 MG: 100 INJECTION SUBCUTANEOUS at 01:03

## 2017-03-04 RX ADMIN — NICOTINE 1 PATCH: 14 PATCH, EXTENDED RELEASE TRANSDERMAL at 08:03

## 2017-03-04 NOTE — PLAN OF CARE
Problem: Patient Care Overview  Goal: Plan of Care Review  Outcome: Ongoing (interventions implemented as appropriate)  Pt VSS and afebrile, free of falls, trauma, injury and skin breakdowns, positions self in bed, ambulates to RR, pain controlled with medication, refuses new placement of IV access, in low locked bed, call light in reach, safety precautions maintained.

## 2017-03-04 NOTE — PROGRESS NOTES
The patient underwent an upper endoscopy and a sigmoidoscopy with no immediate   complications.  The patient underwent a CAT scan, which was reviewed with the   radiologist, which shows hepatomegaly, hepatosplenomegaly and a small spot on   the lungs.  No evidence of any tumor, etc.  We will get some neuroendocrine   workup tomorrow and hopefully control the diarrhea with Imodium and try to send   her home.    Once again, I am grateful to Dr. Johnson for this referral.      MICHELE/  dd: 03/03/2017 17:18:10 (CST)  td: 03/03/2017 22:05:26 (CST)  Doc ID   #[id number]  Job ID #[id number]    CC:

## 2017-03-05 VITALS
WEIGHT: 176 LBS | HEIGHT: 61 IN | TEMPERATURE: 98 F | BODY MASS INDEX: 33.23 KG/M2 | HEART RATE: 62 BPM | DIASTOLIC BLOOD PRESSURE: 74 MMHG | OXYGEN SATURATION: 95 % | RESPIRATION RATE: 16 BRPM | SYSTOLIC BLOOD PRESSURE: 154 MMHG

## 2017-03-05 LAB
ALBUMIN SERPL BCP-MCNC: 2.5 G/DL
ALP SERPL-CCNC: 111 U/L
ALT SERPL W/O P-5'-P-CCNC: 9 U/L
ANION GAP SERPL CALC-SCNC: 8 MMOL/L
AST SERPL-CCNC: 65 U/L
BASOPHILS # BLD AUTO: 0 K/UL
BASOPHILS NFR BLD: 0.4 %
BILIRUB SERPL-MCNC: 1.1 MG/DL
BUN SERPL-MCNC: <2 MG/DL
CALCIUM SERPL-MCNC: 8.8 MG/DL
CHLORIDE SERPL-SCNC: 105 MMOL/L
CO2 SERPL-SCNC: 27 MMOL/L
CREAT SERPL-MCNC: 0.6 MG/DL
DIFFERENTIAL METHOD: ABNORMAL
EOSINOPHIL # BLD AUTO: 0.1 K/UL
EOSINOPHIL NFR BLD: 1.2 %
ERYTHROCYTE [DISTWIDTH] IN BLOOD BY AUTOMATED COUNT: 14.9 %
EST. GFR  (AFRICAN AMERICAN): >60 ML/MIN/1.73 M^2
EST. GFR  (NON AFRICAN AMERICAN): >60 ML/MIN/1.73 M^2
GLUCOSE SERPL-MCNC: 93 MG/DL
HCT VFR BLD AUTO: 38.7 %
HGB BLD-MCNC: 13 G/DL
LYMPHOCYTES # BLD AUTO: 1.4 K/UL
LYMPHOCYTES NFR BLD: 23.1 %
MAGNESIUM SERPL-MCNC: 1.4 MG/DL
MCH RBC QN AUTO: 33.5 PG
MCHC RBC AUTO-ENTMCNC: 33.7 %
MCV RBC AUTO: 100 FL
MONOCYTES # BLD AUTO: 0.6 K/UL
MONOCYTES NFR BLD: 10.6 %
NEUTROPHILS # BLD AUTO: 3.9 K/UL
NEUTROPHILS NFR BLD: 64.7 %
PHOSPHATE SERPL-MCNC: 4.7 MG/DL
PLATELET # BLD AUTO: 220 K/UL
PMV BLD AUTO: 7.8 FL
POTASSIUM SERPL-SCNC: 3.4 MMOL/L
PROT SERPL-MCNC: 5.8 G/DL
RBC # BLD AUTO: 3.89 M/UL
SODIUM SERPL-SCNC: 140 MMOL/L
WBC # BLD AUTO: 6 K/UL

## 2017-03-05 PROCEDURE — 25000003 PHARM REV CODE 250: Performed by: NURSE PRACTITIONER

## 2017-03-05 PROCEDURE — 85025 COMPLETE CBC W/AUTO DIFF WBC: CPT

## 2017-03-05 PROCEDURE — 83735 ASSAY OF MAGNESIUM: CPT

## 2017-03-05 PROCEDURE — 63600175 PHARM REV CODE 636 W HCPCS: Performed by: NURSE PRACTITIONER

## 2017-03-05 PROCEDURE — 25000003 PHARM REV CODE 250: Performed by: INTERNAL MEDICINE

## 2017-03-05 PROCEDURE — 83519 RIA NONANTIBODY: CPT

## 2017-03-05 PROCEDURE — 84100 ASSAY OF PHOSPHORUS: CPT

## 2017-03-05 PROCEDURE — 80053 COMPREHEN METABOLIC PANEL: CPT

## 2017-03-05 RX ORDER — SPIRONOLACTONE 25 MG/1
25 TABLET ORAL DAILY
Qty: 30 TABLET | Refills: 0 | Status: SHIPPED | OUTPATIENT
Start: 2017-03-05 | End: 2018-03-05

## 2017-03-05 RX ORDER — TRAZODONE HYDROCHLORIDE 50 MG/1
100 TABLET ORAL NIGHTLY
Status: DISCONTINUED | OUTPATIENT
Start: 2017-03-05 | End: 2017-03-05 | Stop reason: HOSPADM

## 2017-03-05 RX ORDER — POTASSIUM CHLORIDE 750 MG/1
20 CAPSULE, EXTENDED RELEASE ORAL DAILY
Qty: 20 CAPSULE | Refills: 0 | Status: SHIPPED | OUTPATIENT
Start: 2017-03-05

## 2017-03-05 RX ORDER — LORAZEPAM 0.5 MG/1
0.5 TABLET ORAL NIGHTLY
Qty: 30 TABLET | Refills: 0 | Status: SHIPPED | OUTPATIENT
Start: 2017-03-05 | End: 2017-04-04

## 2017-03-05 RX ORDER — DIPHENOXYLATE HYDROCHLORIDE AND ATROPINE SULFATE 2.5; .025 MG/1; MG/1
1 TABLET ORAL EVERY 6 HOURS PRN
Qty: 20 TABLET | Refills: 0 | Status: SHIPPED | OUTPATIENT
Start: 2017-03-05 | End: 2017-03-10 | Stop reason: SDUPTHER

## 2017-03-05 RX ORDER — TRAZODONE HYDROCHLORIDE 100 MG/1
100 TABLET ORAL NIGHTLY
Qty: 30 TABLET | Refills: 0 | Status: SHIPPED | OUTPATIENT
Start: 2017-03-05 | End: 2018-03-05

## 2017-03-05 RX ORDER — LANOLIN ALCOHOL/MO/W.PET/CERES
800 CREAM (GRAM) TOPICAL
Qty: 60 TABLET | Refills: 0 | COMMUNITY
Start: 2017-03-05

## 2017-03-05 RX ADMIN — LACTOBACILLUS ACIDOPHILUS / LACTOBACILLUS BULGARICUS 1 EACH: 100 MILLION CFU STRENGTH GRANULES at 08:03

## 2017-03-05 RX ADMIN — DIPHENOXYLATE HYDROCHLORIDE AND ATROPINE SULFATE 1 TABLET: 2.5; .025 TABLET ORAL at 09:03

## 2017-03-05 RX ADMIN — HYDROCODONE BITARTRATE AND ACETAMINOPHEN 1 TABLET: 5; 325 TABLET ORAL at 06:03

## 2017-03-05 RX ADMIN — MAGNESIUM SULFATE HEPTAHYDRATE 3 G: 500 INJECTION, SOLUTION INTRAMUSCULAR; INTRAVENOUS at 07:03

## 2017-03-05 RX ADMIN — NICOTINE 1 PATCH: 14 PATCH, EXTENDED RELEASE TRANSDERMAL at 08:03

## 2017-03-05 RX ADMIN — CHOLESTYRAMINE 4 G: 4 POWDER, FOR SUSPENSION ORAL at 08:03

## 2017-03-05 RX ADMIN — PANTOPRAZOLE SODIUM 40 MG: 40 TABLET, DELAYED RELEASE ORAL at 08:03

## 2017-03-05 RX ADMIN — THIAMINE HCL TAB 100 MG 100 MG: 100 TAB at 08:03

## 2017-03-05 RX ADMIN — SPIRONOLACTONE 25 MG: 25 TABLET ORAL at 08:03

## 2017-03-05 RX ADMIN — IRBESARTAN 150 MG: 150 TABLET ORAL at 08:03

## 2017-03-05 RX ADMIN — FOLIC ACID 1 MG: 1 TABLET ORAL at 08:03

## 2017-03-05 NOTE — DISCHARGE INSTRUCTIONS
Thank you for choosing [facility] for your medical care. The primary doctor who is taking care of you at the time of your discharge is Susy Johnson MD.     You were admitted to the hospital with Hypomagnesemia.     Please note your discharge instructions, including diet/activity restrictions, follow-up appointments, and medication changes.  If you have any questions about your medical issues, prescriptions, or any other questions, please feel free to contact the [facility] Medicine Dept at [phone] and we will help.    If you are previously with Home health, outpatient PT/OT or under a therapy program, you are cleared to return to those programs.    Please direct all long term medication refills and follow up to your primary care provider, Ammon Santos MD. Thank you again for letting us take care of your health care needs.    CALL OFFICE TO SCHEDULE OUTPATIENT LABS.

## 2017-03-05 NOTE — PROGRESS NOTES
"Progress Note  [facility] Medicine    Admit Date: 2/23/2017    SUBJECTIVE:     Follow-up For:  Hypomagnesemia      Interval history (See H&P for complete P,F,SHx) : Patient feeling better. Long discussion with her and daughter regarding her ETOH use, cirrhosis, and plan of care. Will need IV Mg supplementation prior to discharge.    Review of Systems: List if applicable  Review of Systems   Constitutional: Positive for malaise/fatigue. Negative for chills and fever.   Respiratory: Negative for cough and shortness of breath.    Cardiovascular: Negative for chest pain and leg swelling.   Genitourinary: Negative for dysuria.   Musculoskeletal: Negative for back pain.   Skin: Negative for rash.   Neurological: Negative for focal weakness.   Psychiatric/Behavioral: Negative for depression.         OBJECTIVE:     Vital Signs Range (Last 24H):  Temp:  [98 °F (36.7 °C)-98.5 °F (36.9 °C)]   Pulse:  [69-75]   Resp:  [18]   BP: (124-174)/(71-74)   SpO2:  [95 %]     I & O (Last 24H):    Intake/Output Summary (Last 24 hours) at 03/04/17 8128  Last data filed at 03/04/17 0400   Gross per 24 hour   Intake              180 ml   Output                0 ml   Net              180 ml       Estimated body mass index is 31.95 kg/(m^2) as calculated from the following:    Height as of this encounter: 5' 1" (1.549 m).    Weight as of this encounter: 76.7 kg (169 lb 1.5 oz).    Physical Exam   Constitutional: She is oriented to person, place, and time and well-developed, well-nourished, and in no distress. No distress.   HENT:   Head: Normocephalic and atraumatic.   Mouth/Throat: No oropharyngeal exudate.   Eyes: Pupils are equal, round, and reactive to light. Right eye exhibits no discharge. Left eye exhibits no discharge. Scleral icterus is present.   Neck: Normal range of motion.   Cardiovascular: Normal rate, regular rhythm and intact distal pulses.  Exam reveals no gallop and no friction rub.    No murmur heard.  Pulmonary/Chest: Effort " normal and breath sounds normal. No stridor. No respiratory distress. She has no wheezes.   Abdominal: Soft. Bowel sounds are normal. She exhibits distension. There is no tenderness.   Musculoskeletal: Normal range of motion. She exhibits no edema or tenderness.   Lymphadenopathy:     She has no cervical adenopathy.   Neurological: She is alert and oriented to person, place, and time. She has normal reflexes. No cranial nerve deficit. She exhibits normal muscle tone. Gait normal.   Skin: No rash noted. She is not diaphoretic. No erythema. No pallor.   + jaundice noted   Psychiatric: Affect and judgment normal.   Nursing note and vitals reviewed.        Laboratory/Diagnostic Data:  Reviewed and noted in plan where applicable- Please see chart for full lab data.    Medications:  Medication list was reviewed and changes noted under Assessment/Plan.    ASSESSMENT/PLAN:     Active Problems:    Active Hospital Problems    Diagnosis  POA    *Hypomagnesemia, hypokalemia [E83.42]-   Latest electrolyte panel reviewed BMP  Lab Results   Component Value Date     03/04/2017    K 3.3 (L) 03/04/2017     03/04/2017    CO2 26 03/04/2017    BUN <2 (L) 03/04/2017    CREATININE 0.6 03/04/2017    CALCIUM 8.3 (L) 03/04/2017    ANIONGAP 7 (L) 03/04/2017    ESTGFRAFRICA >60 03/04/2017    EGFRNONAA >60 03/04/2017   - Will replace and continue to monitor daily electrolytes.    Yes    Intractable diarrhea [R19.7]-  Using lomotil for PRN diarrhea, likely a result of autonomic dysregulation from ETOH, as well as potential secretory diarrhea from malabsorption. Improved. Defer further Tx to GI.  Yes    ETOH abuse [F10.10]-  Chronic. Encourage cessation program, especially in light of ongoing cirrhosis. Lengthy discussion with patient and daughter.  Yes    Alcoholic cirrhosis of liver without ascites [K70.30]-  Child's class B. Meld- MELD-Na score: 8 at 3/4/2017  5:35 AM  MELD score: 8 at 3/4/2017  5:35 AM  Calculated from:  Serum  Creatinine: 0.6 mg/dL (Rounded to 1) at 3/4/2017  5:35 AM  Serum Sodium: 138 mmol/L (Rounded to 137) at 3/4/2017  5:35 AM  Total Bilirubin: 1.1 mg/dL at 3/4/2017  5:35 AM  INR(ratio): 1.1 at 3/3/2017  5:06 AM  Age: 57 years    Yes    Hypertension [I10]- Chronic, controlled.  Continue home regimen monitoring BP closely.  Will titrate BP medications as needed for sustained BP control.  Yes    Gastroesophageal reflux disease [K21.9]- Chronic problem. Will continue chronic medication(s), PPI and monitor for any changes, adjusting as needed.     Yes      Resolved Hospital Problems    Diagnosis Date Resolved POA    Nausea and vomiting [R11.2] 03/04/2017 Yes    Acute Gastroenteritis [K52.9] 03/04/2017 Yes       VTE Risk Mitigation         Ordered     enoxaparin injection 40 mg  Daily     Route:  Subcutaneous        02/23/17 1637     Medium Risk of VTE  Once      02/23/17 1637          Time spent in care of patient (Greater than 1/2 spent in direct face to face contact): 37 min

## 2017-03-05 NOTE — PLAN OF CARE
Problem: Patient Care Overview  Goal: Plan of Care Review  Outcome: Ongoing (interventions implemented as appropriate)  Patient will be discharging after lunch and when IV mag has finished, VS stable daughter at bedside, patient will follow up with physician after discharge

## 2017-03-05 NOTE — PLAN OF CARE
Problem: Patient Care Overview  Goal: Plan of Care Review  Outcome: Ongoing (interventions implemented as appropriate)  Pt aaox4. Patient continent of bowl and bladder. Pain controlled with PO pain medicine. IV Mag finished this shift. Patient free from falls will continue to monitor.

## 2017-03-05 NOTE — PROGRESS NOTES
A 57-year-old  female with alcoholic liver disease.  The patient had a   low magnesium and is being supplemented with IV magnesium.  I agree that the   patient needs IV magnesium, as the patient has persistent diarrhea and the   diarrhea may become worse due to magnesium.  The patient is doing better.  His   diarrhea is better controlled with Lomotil, though I would not like to use this   on a long-term basis.  At this point, we will start the patient on   spironolactone 25 mg once daily, see how she does and proceed from there.  The   lab values were discussed with the patient's daughter who is a nurse and went   through all the lab data since admission.    Once again, I am grateful to Dr. Johnson and Dr. Satnos for this referral.      MICHELE/  dd: 03/04/2017 16:23:04 (CST)  td: 03/04/2017 20:10:34 (CST)  Doc ID   #[id number]  Job ID #[id number]    CC: Susy Santos M.D.

## 2017-03-05 NOTE — PROGRESS NOTES
Patient to be NPO for lab test in am at 2200 tonight, Dr. Gonzalez saw the patient and ordered additional test, Dr. Lewis is the hospitalist, ambulatory, clear speech, daughter at bedside throughout shift, patient is very pleasant and makes needs known, bed locked and call light in reach.

## 2017-03-05 NOTE — PROGRESS NOTES
Patient discharging home, no signs of distress at this time, VS stable, IV removed as ordered, tolerated well, discharge instructions given and patient verbalizes understanding, patient leaving the floor assisted by staff member, patient has follow up appointment with Dr. Gonzalez

## 2017-03-05 NOTE — PROGRESS NOTES
Notified Np Suit that labs that required fasting had been drawn. New orders for low sat fat/ chol diet.

## 2017-03-05 NOTE — PROGRESS NOTES
""Patient states that current doctors stated that yesterday would be the last day of antibiotics. " pt is currently refusing meds. NP Suit notified. No new orders at this time.   "

## 2017-03-06 ENCOUNTER — TELEPHONE (OUTPATIENT)
Dept: HEPATOLOGY | Facility: HOSPITAL | Age: 57
End: 2017-03-06

## 2017-03-06 LAB — O+P STL TRI STN: NORMAL

## 2017-03-06 RX ORDER — HYDROCODONE BITARTRATE AND ACETAMINOPHEN 5; 325 MG/1; MG/1
1 TABLET ORAL EVERY 6 HOURS PRN
Qty: 7 TABLET | Refills: 0 | Status: SHIPPED | OUTPATIENT
Start: 2017-03-06

## 2017-03-06 NOTE — TELEPHONE ENCOUNTER
Received call from daughter, patient having pain in RUQ. Asking for pain meds. Agreed to low dose hydrocodone to use sparingly.

## 2017-03-06 NOTE — PROGRESS NOTES
A 57-year-old  female who has hepatosplenomegaly on the basis of   alcohol and probably has cirrhosis of liver.  The patient's upper endoscopy   failed to reveal any esophageal varices.  The patient denies any history of   dysphagia, odynophagia, etc.  The patient's potassium and magnesium is being   corrected.  The patient is going to be followed up closely as an outpatient and   will probably be a good candidate for outpatient rehab.  Detailed examination   was done, lab was reviewed and the patient is going home today.    Once again, I am grateful to Dr. Santos and Dr. Johsnon for this referral.        /kirill [id number] efe(s)        MICHELE/  dd: 03/05/2017 18:20:16 (CST)  td: 03/05/2017 23:09:01 (CST)  Doc ID   #[id number]  Job ID #[id number]    CC:

## 2017-03-06 NOTE — DISCHARGE SUMMARY
[facility] Hospital Medicine  Discharge Summary      Patient Name: Sophia Edward  MRN: [id number]  Admission Date: 2/23/2017  Hospital Length of Stay: 7 days  Discharge Date and Time: 3/5/2017  1:25 PM  Attending Physician: Tameka att. providers found   Discharging Provider: Clyde Lewis MD  Primary Care Provider: Ammon Santos MD      HPI:        Procedure(s) (LRB):  ESOPHAGOGASTRODUODENOSCOPY (EGD) (N/A)  SIGMOIDOSCOPY-FLEXIBLE (N/A)      Indwelling Lines/Drains at time of discharge:   Lines/Drains/Airways          No matching active lines, drains, or airways        Hospital Course:   Patient ETOH dependence and alcoholic cirrhosis admitted for alcoholic gastritis and uncontrolled nausea/vomiting. She improved on PPI/Carafate and was transitioned through her DTs. Patient developed severe diarrhea subsequent to this and underwent workup for this- likely thought to either be secretory (enteropathy) versus autonomic dysregulation. Diarrhea resolved with lomotil and patient returned to her symptomatic baseline. She was, however profoundly hypomagnesemic and hypokalemic requiring multiple rounds of IV supplementation. She was discharged on PO mag and potassium and with orders to have outpatinet labs drawn to recheck her electrolytes. She was given resources for ETOH cessation.    Patient and/or family was seen on day of discharge by myself and was examined. They were stable for discharge. Discharge plan, follow up instructions, and contacts in case of further needs were discussed in detail.         Consults:     Significant Diagnostic Studies: Labs:   BMP:   Recent Labs  Lab 03/04/17 0535 03/04/17  2145 03/05/17 0527   GLU 84 112* 93    135* 140   K 3.3* 3.2* 3.4*    101 105   CO2 26 24 27   BUN <2* 2* <2*   CREATININE 0.6 0.6 0.6   CALCIUM 8.3* 8.8 8.8   MG 1.2* 1.8 1.4*    and CBC   Recent Labs  Lab 03/04/17 0535 03/05/17 0527   WBC 6.30 6.00   HGB 12.9 13.0   HCT 38.5 38.7   PLT  197 220       Pending Diagnostic Studies:     Procedure Component Value Units Date/Time    Chromogranin A [[id number]] Collected:  03/04/17 0535    Order Status:  Sent Lab Status:  In process Updated:  03/04/17 0545    Specimen:  Blood from Blood     EKG 12-LEAD [[id number]]     Order Status:  Sent Lab Status:  No result     EKG 12-lead [[id number]]     Order Status:  Sent Lab Status:  No result     Stool Exam-Ova,Cysts,Parasites [[id number]] Collected:  03/03/17 0755    Order Status:  Sent Lab Status:  In process Updated:  03/03/17 1826    Specimen:  Stool from Stool         Final Active Diagnoses:    Diagnosis Date Noted POA    PRINCIPAL PROBLEM:  Hypomagnesemia, hypokalemia [E83.42] 03/04/2017 Yes    Intractable diarrhea [R19.7] 03/04/2017 Yes    ETOH abuse [F10.10] 03/04/2017 Yes    Alcoholic cirrhosis of liver without ascites [K70.30] 03/04/2017 Yes    Hypertension [I10] 02/23/2017 Yes    Gastroesophageal reflux disease [K21.9] 02/23/2017 Yes      Problems Resolved During this Admission:    Diagnosis Date Noted Date Resolved POA    Nausea and vomiting [R11.2] 02/23/2017 03/04/2017 Yes    Acute Gastroenteritis [K52.9] 02/23/2017 03/04/2017 Yes      Hypertension  Chronic problem. Will continue chronic medications and monitor for any changes, adjusting as needed.          Gastroesophageal reflux disease  Continue PPI.      Mitral valve prolapse  Tele-monitoring.      Hyponatremia - Chronic  Check TSH.  Follow serum lytes.      Nausea and vomiting, resolved as of 3/4/2017  Supportive care.  Use anti-emetics as needed.  Continue IVF hydration.      Acute Gastroenteritis, resolved as of 3/4/2017  Case discussed with Dr. Santos.   Consult Gastronetrologist.   Obtain KUB.  Obtain abdominal US to evaluate hepato-splenomegaly.  Check CBC with differential.  Check CBC, CMP, lipase, amylase and UA.  Check stool for C. Diff, O+P, Cx, WBC and Giardia serology.  Continue IVF hydration.   Use IV anti-emetics as needed.                Discharged Condition: stable    Disposition: Home or Self Care    Follow Up:  Follow-up Information     Follow up with Ammon Santos MD In 2 weeks.    Specialty:  Family Medicine    Contact information:    [address] [place] [address]  [phone]          Follow up with Sayra Monson MD In 2 weeks.    Specialties:  Gastroenterology, Transplant, Hepatology    Contact information:    [address]  [facility] [id number] [phone]          Patient Instructions:     CT Abdomen Pelvis W Wo Contrast   Standing Status: Future  Standing Exp. Date: 03/03/18   Scheduling Instructions: Please add liver spleen volume, triple phase   Pt will be in Room 320   Order Specific Question Answer Comments   Oral/Rectal Contrast instructions: Routine Oral Contrast    Special CT ABD Protocol Request? Routine    Reason for Exam: abd pain    Is the patient pregnant? No    Is the patient allergic to iodine or contrast? Has a steroid / antihistamine prep been administered? No    Is the patient on ANY Metformin drug such as Glugophage/Glucovance?           Should be off drug 48 hours after contrast. Check renal function before restart. No    Age > 60 years? Yes    History of Kidney Disease - including: decreased kidney function, dialysis, kidney transplay, single kidney, kidney cancer, kidney surgery? None    Does the patient have high blood preasure requiring medical treatment? Yes    Diabetes? No    May the Radiologist modify the order per protocol to meet the clinical needs of the patient? Yes    Recist criteria? No    Will this service be billed to a Worker's Comp policy? No      CT Chest Without Contrast   Standing Status: Future  Standing Exp. Date: 03/03/18   Order Specific Question Answer Comments   Is the patient pregnant? No    May the Radiologist modify the order per protocol to meet the clinical needs of the patient? Yes      Diet general     Activity as tolerated     Call MD for:  temperature  >100.4     Call MD for:  persistent nausea and vomiting or diarrhea     Call MD for:  difficulty breathing or increased cough       Medications:  Reconciled Home Medications:   Discharge Medication List as of 3/5/2017 12:32 PM      START taking these medications    Details   diphenoxylate-atropine 2.5-0.025 mg (LOMOTIL) 2.5-0.025 mg per tablet Take 1 tablet by mouth every 6 (six) hours as needed for Diarrhea., Starting 3/5/2017, Until Wed 3/15/17, Normal      lorazepam (ATIVAN) 0.5 MG tablet Take 1 tablet (0.5 mg total) by mouth every evening., Starting 3/5/2017, Until Tue 4/4/17, Normal      magnesium oxide (MAG-OX) 400 mg tablet Take 2 tablets (800 mg total) by mouth 3 (three) times daily with meals., Starting 3/5/2017, Until Discontinued, OTC      potassium chloride (MICRO-K) 10 MEQ CpSR Take 2 capsules (20 mEq total) by mouth once daily., Starting 3/5/2017, Until Discontinued, Normal      spironolactone (ALDACTONE) 25 MG tablet Take 1 tablet (25 mg total) by mouth once daily., Starting 3/5/2017, Until Mon 3/5/18, Normal         CONTINUE these medications which have CHANGED    Details   trazodone (DESYREL) 100 MG tablet Take 1 tablet (100 mg total) by mouth every evening., Starting 3/5/2017, Until Mon 3/5/18, Normal         CONTINUE these medications which have NOT CHANGED    Details   atenolol (TENORMIN) 100 MG tablet Take 100 mg by mouth every evening. , Until Discontinued, Historical Med      irbesartan (AVAPRO) 150 MG tablet Take 150 mg by mouth once daily., Until Discontinued, Historical Med      omeprazole (PRILOSEC) 20 MG capsule Take 20 mg by mouth once daily., Until Discontinued, Historical Med         STOP taking these medications       sodium chloride 1 gram tablet Comments:   Reason for Stopping:             Time spent on the discharge of patient: 35 minutes    Clyde Lewis MD  Department of Hospital Medicine  [facility]

## 2017-03-07 NOTE — PLAN OF CARE
03/07/17 0749   Final Note   Assessment Type Discharge Planning Assessment   Discharge Disposition Home   Discharge planning education complete? Yes

## 2017-03-09 LAB — CGA SERPL-MCNC: 39 NG/ML

## 2017-03-10 ENCOUNTER — TELEPHONE (OUTPATIENT)
Dept: HEPATOLOGY | Facility: HOSPITAL | Age: 57
End: 2017-03-10

## 2017-03-10 RX ORDER — DIPHENOXYLATE HYDROCHLORIDE AND ATROPINE SULFATE 2.5; .025 MG/1; MG/1
1 TABLET ORAL EVERY 6 HOURS PRN
Qty: 20 TABLET | Refills: 0 | Status: SHIPPED | OUTPATIENT
Start: 2017-03-10 | End: 2017-03-20

## 2017-04-13 LAB — PANCREASTATIN SERPL-MCNC: 68 PG/ML (ref 0–88)

## 2017-06-02 DIAGNOSIS — Z12.31 VISIT FOR SCREENING MAMMOGRAM: Primary | ICD-10-CM

## 2017-06-09 ENCOUNTER — HOSPITAL ENCOUNTER (OUTPATIENT)
Dept: RADIOLOGY | Facility: HOSPITAL | Age: 57
Discharge: HOME | End: 2017-06-09
Attending: FAMILY MEDICINE
Payer: COMMERCIAL

## 2017-06-09 DIAGNOSIS — Z12.31 VISIT FOR SCREENING MAMMOGRAM: ICD-10-CM

## 2017-06-09 PROCEDURE — 77067 SCR MAMMO BI INCL CAD: CPT | Mod: 26,,, | Performed by: RADIOLOGY

## 2017-06-09 PROCEDURE — 77067 SCR MAMMO BI INCL CAD: CPT | Mod: TC

## 2017-06-09 PROCEDURE — 77063 BREAST TOMOSYNTHESIS BI: CPT | Mod: 26,,, | Performed by: RADIOLOGY

## 2018-06-15 ENCOUNTER — TELEPHONE (OUTPATIENT)
Dept: RADIOLOGY | Facility: CLINIC | Age: 58
End: 2018-06-15

## 2021-12-24 NOTE — PROGRESS NOTES
Spoke with Dr. Valenzuela regarding Magnesium of 0.9. New orders for 2g Mag sulfate IVPB now and recheck Mg level in the morning.    Patient

## 2023-07-12 ENCOUNTER — APPOINTMENT (RX ONLY)
Dept: URBAN - METROPOLITAN AREA CLINIC 178 | Facility: CLINIC | Age: 63
Setting detail: DERMATOLOGY
End: 2023-07-12

## 2023-07-12 DIAGNOSIS — D69.2 OTHER NONTHROMBOCYTOPENIC PURPURA: ICD-10-CM

## 2023-07-12 DIAGNOSIS — D49.2 NEOPLASM OF UNSPECIFIED BEHAVIOR OF BONE, SOFT TISSUE, AND SKIN: ICD-10-CM

## 2023-07-12 DIAGNOSIS — L82.0 INFLAMED SEBORRHEIC KERATOSIS: ICD-10-CM

## 2023-07-12 DIAGNOSIS — L85.3 XEROSIS CUTIS: ICD-10-CM

## 2023-07-12 DIAGNOSIS — L81.4 OTHER MELANIN HYPERPIGMENTATION: ICD-10-CM

## 2023-07-12 PROCEDURE — ? COUNSELING

## 2023-07-12 PROCEDURE — ? BENIGN DESTRUCTION

## 2023-07-12 PROCEDURE — ? BIOPSY BY SHAVE METHOD

## 2023-07-12 PROCEDURE — 99203 OFFICE O/P NEW LOW 30 MIN: CPT | Mod: 25

## 2023-07-12 PROCEDURE — 11102 TANGNTL BX SKIN SINGLE LES: CPT | Mod: 59

## 2023-07-12 PROCEDURE — 17110 DESTRUCTION B9 LES UP TO 14: CPT

## 2023-07-12 ASSESSMENT — LOCATION DETAILED DESCRIPTION DERM
LOCATION DETAILED: LEFT PROXIMAL DORSAL FOREARM
LOCATION DETAILED: RIGHT ELBOW
LOCATION DETAILED: LEFT DISTAL DORSAL FOREARM
LOCATION DETAILED: RIGHT PROXIMAL DORSAL FOREARM
LOCATION DETAILED: RIGHT DISTAL DORSAL FOREARM
LOCATION DETAILED: RIGHT MEDIAL FRONTAL SCALP
LOCATION DETAILED: RIGHT SUPERIOR FOREHEAD

## 2023-07-12 ASSESSMENT — LOCATION ZONE DERM
LOCATION ZONE: SCALP
LOCATION ZONE: FACE
LOCATION ZONE: ARM

## 2023-07-12 ASSESSMENT — LOCATION SIMPLE DESCRIPTION DERM
LOCATION SIMPLE: LEFT FOREARM
LOCATION SIMPLE: RIGHT ELBOW
LOCATION SIMPLE: RIGHT FOREHEAD
LOCATION SIMPLE: RIGHT FOREARM
LOCATION SIMPLE: RIGHT SCALP

## 2023-07-12 NOTE — PROCEDURE: BENIGN DESTRUCTION
Add 52 Modifier (Optional): no
Consent: The patient's consent was obtained including but not limited to risks of crusting, scabbing, blistering, scarring, darker or lighter pigmentary change, recurrence, incomplete removal and infection.
Medical Necessity Clause: This procedure was medically necessary because the lesions that were treated were: irritated and enlarging.
Treatment Number (Will Not Render If 0): 0
Render Post-Care Instructions In Note?: yes
Post-Care Instructions: I reviewed with the patient in detail post-care instructions. Patient is to wear sunprotection, and avoid picking at any of the treated lesions. Pt may apply Vaseline to crusted or scabbing areas.
Medical Necessity Information: It is in your best interest to select a reason for this procedure from the list below. All of these items fulfill various CMS LCD requirements except the new and changing color options.
Detail Level: Simple

## 2023-07-12 NOTE — PROCEDURE: BIOPSY BY SHAVE METHOD
Detail Level: Detailed
Depth Of Biopsy: dermis
Was A Bandage Applied: Yes
Size Of Lesion In Cm: 0.3
Biopsy Type: H and E
Biopsy Method: Dermablade
Anesthesia Type: 1% lidocaine with epinephrine
Anesthesia Volume In Cc (Will Not Render If 0): 0.5
Additional Anesthesia Volume In Cc (Will Not Render If 0): 0
Hemostasis: Migue's
Wound Care: Vaseline
Dressing: Band-Aid
Destruction After The Procedure: No
Type Of Destruction Used: Curettage
Curettage Text: The wound bed was treated with curettage after the biopsy was performed.
Cryotherapy Text: The wound bed was treated with cryotherapy after the biopsy was performed.
Electrodesiccation Text: The wound bed was treated with electrodesiccation after the biopsy was performed.
Electrodesiccation And Curettage Text: The wound bed was treated with electrodesiccation and curettage after the biopsy was performed.
Silver Nitrate Text: The wound bed was treated with silver nitrate after the biopsy was performed.
Lab: -91
Consent: Written consent was obtained and risks were reviewed including but not limited to scarring, infection, bleeding, scabbing, incomplete removal, nerve damage and allergy to anesthesia.
Post-Care Instructions: I reviewed with the patient in detail post-care instructions. Patient is to keep the biopsy site dry overnight, and then apply vaseline twice daily until healed. Patient may apply white vinegar and warm water soaks to remove any crusting.
Notification Instructions: Patient will be notified of biopsy results. However, patient instructed to call the office if not contacted within 2 weeks.
Billing Type: [employer]
Information: Selecting Yes will display possible errors in your note based on the variables you have selected. This validation is only offered as a suggestion for you. PLEASE NOTE THAT THE VALIDATION TEXT WILL BE REMOVED WHEN YOU FINALIZE YOUR NOTE. IF YOU WANT TO FAX A PRELIMINARY NOTE YOU WILL NEED TO TOGGLE THIS TO 'NO' IF YOU DO NOT WANT IT IN YOUR FAXED NOTE.

## 2023-07-12 NOTE — PROCEDURE: COUNSELING
Detail Level: Simple
Patient Specific Counseling (Will Not Stick From Patient To Patient): Eucerin advanced repair cream and aquaphor healing ointment recommended
Detail Level: Detailed
Detail Level: Generalized
Detail Level: Zone